# Patient Record
Sex: MALE | Race: WHITE | Employment: FULL TIME | URBAN - METROPOLITAN AREA
[De-identification: names, ages, dates, MRNs, and addresses within clinical notes are randomized per-mention and may not be internally consistent; named-entity substitution may affect disease eponyms.]

---

## 2017-07-21 ENCOUNTER — TELEPHONE (OUTPATIENT)
Dept: INTERNAL MEDICINE CLINIC | Facility: CLINIC | Age: 47
End: 2017-07-21

## 2017-07-21 NOTE — TELEPHONE ENCOUNTER
Actions Requested:   Appointment made for Monday 7/24/17  Problem:   Blood pressure 171/95  Onset and Timing:   today  Associated Symptoms:   Patient was a doctor today for his elbow and his blood pressure was  171/95  Denies any headache, blurred vision,

## 2017-09-23 RX ORDER — FLUTICASONE PROPIONATE 50 MCG
SPRAY, SUSPENSION (ML) NASAL
Qty: 0 G | Refills: 1 | OUTPATIENT
Start: 2017-09-23

## 2017-09-23 NOTE — TELEPHONE ENCOUNTER
Pt has not been seen by any of our IM/FM providers. Therefore refill request denied with note for pt to call back and schedule appt.       Refill Protocol Appointment Criteria  · Appointment scheduled in the past 12 months or in the next 3 months  Recent Ou

## 2018-11-07 ENCOUNTER — APPOINTMENT (OUTPATIENT)
Dept: ULTRASOUND IMAGING | Facility: HOSPITAL | Age: 48
End: 2018-11-07
Payer: COMMERCIAL

## 2018-11-07 ENCOUNTER — APPOINTMENT (OUTPATIENT)
Dept: GENERAL RADIOLOGY | Facility: HOSPITAL | Age: 48
End: 2018-11-07
Attending: EMERGENCY MEDICINE
Payer: COMMERCIAL

## 2018-11-07 ENCOUNTER — HOSPITAL ENCOUNTER (EMERGENCY)
Facility: HOSPITAL | Age: 48
Discharge: HOME | End: 2018-11-07
Payer: COMMERCIAL

## 2018-11-07 VITALS
BODY MASS INDEX: 31.1 KG/M2 | OXYGEN SATURATION: 99 % | HEIGHT: 69 IN | DIASTOLIC BLOOD PRESSURE: 82 MMHG | SYSTOLIC BLOOD PRESSURE: 150 MMHG | TEMPERATURE: 98 F | WEIGHT: 210 LBS | HEART RATE: 86 BPM | RESPIRATION RATE: 20 BRPM

## 2018-11-07 DIAGNOSIS — S86.811A STRAIN OF CALF MUSCLE, RIGHT, INITIAL ENCOUNTER: Primary | ICD-10-CM

## 2018-11-07 PROCEDURE — 99284 EMERGENCY DEPT VISIT MOD MDM: CPT

## 2018-11-07 PROCEDURE — 73590 X-RAY EXAM OF LOWER LEG: CPT | Performed by: EMERGENCY MEDICINE

## 2018-11-07 PROCEDURE — 93971 EXTREMITY STUDY: CPT

## 2018-11-07 RX ORDER — NAPROXEN 500 MG/1
500 TABLET ORAL 2 TIMES DAILY WITH MEALS
Qty: 10 TABLET | Refills: 0 | Status: SHIPPED | OUTPATIENT
Start: 2018-11-07 | End: 2018-11-12

## 2018-11-07 NOTE — ED PROVIDER NOTES
Patient Seen in: [facility] Emergency Department    History   Patient presents with:  Musculoskeletal Problem    Stated Complaint: R Calf Pain    HPI    42-year-old male who does not smoke and without a history of blood clots he currently has not Oriented to person, place, and time. Appears well-developed. No distress. Head: Normocephalic and atraumatic. Eyes: Conjunctivae are normal. Pupils are equal, round, and reactive to light.    Cardiovascular: Normal rate, regular rhythm and intact dista right lower extremity was performed in the usual manner. FINDINGS: The femoral and popliteal veins appear normal.  Normal flow was demonstrated with color and pulsed Doppler.   Visualized portions of the great and small saphenous, posterior tibial, and per

## 2018-11-07 NOTE — ED INITIAL ASSESSMENT (HPI)
Pt to ER with c/o right calf pain since Sunday. Pt denies recent travel. Pt denies hx of DVT. Pt denies chest pain or sob.

## 2020-12-01 ENCOUNTER — APPOINTMENT (OUTPATIENT)
Dept: GENERAL RADIOLOGY | Facility: HOSPITAL | Age: 50
DRG: 812 | End: 2020-12-01
Attending: EMERGENCY MEDICINE
Payer: COMMERCIAL

## 2020-12-01 ENCOUNTER — HOSPITAL ENCOUNTER (INPATIENT)
Facility: HOSPITAL | Age: 50
LOS: 1 days | Discharge: HOME | DRG: 812 | End: 2020-12-02
Attending: EMERGENCY MEDICINE | Admitting: HOSPITALIST
Payer: COMMERCIAL

## 2020-12-01 DIAGNOSIS — D50.9 MICROCYTIC ANEMIA: ICD-10-CM

## 2020-12-01 DIAGNOSIS — K57.90 DIVERTICULOSIS: ICD-10-CM

## 2020-12-01 DIAGNOSIS — R55 SYNCOPE, NEAR: Primary | ICD-10-CM

## 2020-12-01 DIAGNOSIS — K31.7 GASTRIC POLYP: ICD-10-CM

## 2020-12-01 DIAGNOSIS — K64.8 INTERNAL HEMORRHOIDS: ICD-10-CM

## 2020-12-01 PROBLEM — D64.9 SYMPTOMATIC ANEMIA: Status: ACTIVE | Noted: 2020-12-01

## 2020-12-01 PROCEDURE — 99223 1ST HOSP IP/OBS HIGH 75: CPT | Performed by: HOSPITALIST

## 2020-12-01 PROCEDURE — 71045 X-RAY EXAM CHEST 1 VIEW: CPT | Performed by: EMERGENCY MEDICINE

## 2020-12-01 PROCEDURE — 30233N1 TRANSFUSION OF NONAUTOLOGOUS RED BLOOD CELLS INTO PERIPHERAL VEIN, PERCUTANEOUS APPROACH: ICD-10-PCS | Performed by: HOSPITALIST

## 2020-12-01 RX ORDER — DEXTROSE MONOHYDRATE 25 G/50ML
50 INJECTION, SOLUTION INTRAVENOUS
Status: DISCONTINUED | OUTPATIENT
Start: 2020-12-01 | End: 2020-12-02

## 2020-12-01 RX ORDER — DOCUSATE SODIUM 100 MG/1
100 CAPSULE, LIQUID FILLED ORAL 2 TIMES DAILY
Status: DISCONTINUED | OUTPATIENT
Start: 2020-12-01 | End: 2020-12-02

## 2020-12-01 RX ORDER — ACETAMINOPHEN 325 MG/1
650 TABLET ORAL EVERY 6 HOURS PRN
Status: DISCONTINUED | OUTPATIENT
Start: 2020-12-01 | End: 2020-12-02

## 2020-12-01 RX ORDER — ONDANSETRON 2 MG/ML
4 INJECTION INTRAMUSCULAR; INTRAVENOUS EVERY 6 HOURS PRN
Status: DISCONTINUED | OUTPATIENT
Start: 2020-12-01 | End: 2020-12-02

## 2020-12-01 RX ORDER — SODIUM CHLORIDE 9 MG/ML
INJECTION, SOLUTION INTRAVENOUS ONCE
Status: COMPLETED | OUTPATIENT
Start: 2020-12-01 | End: 2020-12-01

## 2020-12-01 NOTE — ED PROVIDER NOTES
Patient Seen in: [facility] AND [facility] Emergency Department      History   Patient presents with:  Syncope    Stated Complaint: near syncope    HPI    51-year-old male presents for evaluation after near syncopal episode.   Patient reports this morning while t None (Room air)       Current:/78   Pulse 79   Temp 98 °F (36.7 °C) (Oral)   Resp 14   Ht 175.3 cm (5' 9\")   Wt 88.5 kg   SpO2 99%   BMI 28.80 kg/m²         Physical Exam  Vitals signs and nursing note reviewed.    Constitutional:       General: He i MCHC 23.9 (*)     RDW 22.9 (*)     All other components within normal limits   PRO BETA NATRIURETIC PEPTIDE - Normal   TROPONIN I - Normal   D-DIMER - Normal   RAPID SARS-COV-2 BY PCR - Normal   CBC WITH DIFFERENTIAL WITH PLATELET    Narrative:      The TECHNIQUE:   Single view. FINDINGS:     CARDIAC/VASC: Heart size and pulmonary vascularity normal.     MEDIAST/JEREMÍAS:   No visible mass or adenopathy. LUNGS/PLEURA: No consolidation or pleural effusion.     BONES: No fracture or visible bony les further recommendations for the ED.     Impression:  After review and interpretation of the above emergency department workup, the patient was found to have Syncope, near  (primary encounter diagnosis)  Microcytic anemia    Plan: Patient with near syncopal

## 2020-12-01 NOTE — CONSULTS
[facility] - [facility]    Report of Consultation    Paz De León Patient Status:  Inpatient    3/24/1970 MRN [id number]   Location [facility] 3W/SW Attending Jaida Tate MD   Hosp Day # 0 PCP Stacy Trujillo DO     Date of Admissio Maternal Grandmother         Cancer, lung - smoker   • Cancer Maternal Grandfather         Cancer, lung-smoker       Social History  Patient Guardians:  Not on file    Other Topics            Concern  Caffeine Concern        Yes    Comment:Coffee, 2 cups d (88.5 kg), SpO2 100 %.   GENERAL: well developed, in no apparent distress  SKIN: no rashes,no suspicious lesions  HEENT: atraumatic, normocephalic, oropharynx clear  NECK: supple,no adenopathy, no masses  LUNGS: clear to auscultation  CARDIO: RRR without mu bleeding    Microcytic anemia of unknown chronicity  BRBPR- likely 2/2 known hemorrhoids    Plan  Recommend egd, colonoscopy with possible hemorrhoidal banding to r/o pud, celiac disease, bleeding polyp, etc  Discussed risks including infection, perforatio

## 2020-12-01 NOTE — ED NOTES
Orders for admission, patient is aware of plan and ready to go upstairs. Any questions, please call ED EAGLE Esqueda  at extension [phone].    Type of COVID test sent:rapid- negative  COVID Suspicion level: Low  Titratable drug(s) infusing:n/a  Rate:    LOC at t

## 2020-12-01 NOTE — ED INITIAL ASSESSMENT (HPI)
Pt to ED via EMS for near syncopal episode in the shower. Pt reports not feeling well for the past few months, getting tired and winded faster than normal.    Pt also reports having a brain aneurysm that is being watched. Last CT about a year ago.

## 2020-12-01 NOTE — H&P
[facility]    PATIENT'S NAME: Wilfredo Colbert   ATTENDING PHYSICIAN: Joanna Rhodes MD   PATIENT ACCOUNT#:   [de-identified]    LOCATION:  [address] [place] [address]  MEDICAL RECORD #:   [id number]       YOB: 1970  ADMISSION DATE:       12/01/2 PHYSICAL EXAMINATION:    GENERAL:  Alert and oriented to time, place and person, no acute distress. VITAL SIGNS:  Temperature 98.0, pulse 82, respiratory rate 17, blood pressure 139/77, pulse ox 99% on room air.   HEENT:  Atraumatic, oropharynx clear,

## 2020-12-02 ENCOUNTER — ANESTHESIA EVENT (OUTPATIENT)
Dept: ENDOSCOPY | Facility: HOSPITAL | Age: 50
DRG: 812 | End: 2020-12-02
Payer: COMMERCIAL

## 2020-12-02 ENCOUNTER — ANESTHESIA (OUTPATIENT)
Dept: ENDOSCOPY | Facility: HOSPITAL | Age: 50
DRG: 812 | End: 2020-12-02
Payer: COMMERCIAL

## 2020-12-02 VITALS
WEIGHT: 195 LBS | TEMPERATURE: 98 F | RESPIRATION RATE: 15 BRPM | HEART RATE: 77 BPM | DIASTOLIC BLOOD PRESSURE: 89 MMHG | SYSTOLIC BLOOD PRESSURE: 130 MMHG | HEIGHT: 69 IN | BODY MASS INDEX: 28.88 KG/M2 | OXYGEN SATURATION: 98 %

## 2020-12-02 PROCEDURE — 0DB98ZX EXCISION OF DUODENUM, VIA NATURAL OR ARTIFICIAL OPENING ENDOSCOPIC, DIAGNOSTIC: ICD-10-PCS | Performed by: STUDENT IN AN ORGANIZED HEALTH CARE EDUCATION/TRAINING PROGRAM

## 2020-12-02 PROCEDURE — 0DB68ZX EXCISION OF STOMACH, VIA NATURAL OR ARTIFICIAL OPENING ENDOSCOPIC, DIAGNOSTIC: ICD-10-PCS | Performed by: STUDENT IN AN ORGANIZED HEALTH CARE EDUCATION/TRAINING PROGRAM

## 2020-12-02 PROCEDURE — 99239 HOSP IP/OBS DSCHRG MGMT >30: CPT | Performed by: HOSPITALIST

## 2020-12-02 PROCEDURE — 0DJD8ZZ INSPECTION OF LOWER INTESTINAL TRACT, VIA NATURAL OR ARTIFICIAL OPENING ENDOSCOPIC: ICD-10-PCS | Performed by: STUDENT IN AN ORGANIZED HEALTH CARE EDUCATION/TRAINING PROGRAM

## 2020-12-02 RX ORDER — SODIUM CHLORIDE, SODIUM LACTATE, POTASSIUM CHLORIDE, CALCIUM CHLORIDE 600; 310; 30; 20 MG/100ML; MG/100ML; MG/100ML; MG/100ML
INJECTION, SOLUTION INTRAVENOUS CONTINUOUS
Status: DISCONTINUED | OUTPATIENT
Start: 2020-12-02 | End: 2020-12-02

## 2020-12-02 RX ORDER — GLYCOPYRROLATE 0.2 MG/ML
INJECTION, SOLUTION INTRAMUSCULAR; INTRAVENOUS AS NEEDED
Status: DISCONTINUED | OUTPATIENT
Start: 2020-12-02 | End: 2020-12-02 | Stop reason: SURG

## 2020-12-02 RX ORDER — HYDROCODONE BITARTRATE AND ACETAMINOPHEN 5; 325 MG/1; MG/1
1 TABLET ORAL EVERY 6 HOURS PRN
Status: DISCONTINUED | OUTPATIENT
Start: 2020-12-02 | End: 2020-12-02

## 2020-12-02 RX ORDER — NALOXONE HYDROCHLORIDE 0.4 MG/ML
80 INJECTION, SOLUTION INTRAMUSCULAR; INTRAVENOUS; SUBCUTANEOUS AS NEEDED
Status: DISCONTINUED | OUTPATIENT
Start: 2020-12-02 | End: 2020-12-02 | Stop reason: HOSPADM

## 2020-12-02 RX ORDER — DEXTROSE MONOHYDRATE 25 G/50ML
50 INJECTION, SOLUTION INTRAVENOUS
Status: DISCONTINUED | OUTPATIENT
Start: 2020-12-02 | End: 2020-12-02 | Stop reason: HOSPADM

## 2020-12-02 RX ORDER — MIDAZOLAM HYDROCHLORIDE 1 MG/ML
INJECTION INTRAMUSCULAR; INTRAVENOUS AS NEEDED
Status: DISCONTINUED | OUTPATIENT
Start: 2020-12-02 | End: 2020-12-02 | Stop reason: SURG

## 2020-12-02 RX ORDER — LIDOCAINE HYDROCHLORIDE 10 MG/ML
INJECTION, SOLUTION EPIDURAL; INFILTRATION; INTRACAUDAL; PERINEURAL AS NEEDED
Status: DISCONTINUED | OUTPATIENT
Start: 2020-12-02 | End: 2020-12-02 | Stop reason: SURG

## 2020-12-02 RX ORDER — SODIUM CHLORIDE, SODIUM LACTATE, POTASSIUM CHLORIDE, CALCIUM CHLORIDE 600; 310; 30; 20 MG/100ML; MG/100ML; MG/100ML; MG/100ML
INJECTION, SOLUTION INTRAVENOUS CONTINUOUS PRN
Status: DISCONTINUED | OUTPATIENT
Start: 2020-12-02 | End: 2020-12-02 | Stop reason: SURG

## 2020-12-02 RX ADMIN — GLYCOPYRROLATE 0.1 MG: 0.2 INJECTION, SOLUTION INTRAMUSCULAR; INTRAVENOUS at 13:03:00

## 2020-12-02 RX ADMIN — SODIUM CHLORIDE, SODIUM LACTATE, POTASSIUM CHLORIDE, CALCIUM CHLORIDE: 600; 310; 30; 20 INJECTION, SOLUTION INTRAVENOUS at 12:48:00

## 2020-12-02 RX ADMIN — SODIUM CHLORIDE, SODIUM LACTATE, POTASSIUM CHLORIDE, CALCIUM CHLORIDE: 600; 310; 30; 20 INJECTION, SOLUTION INTRAVENOUS at 13:23:00

## 2020-12-02 RX ADMIN — LIDOCAINE HYDROCHLORIDE 50 MG: 10 INJECTION, SOLUTION EPIDURAL; INFILTRATION; INTRACAUDAL; PERINEURAL at 12:52:00

## 2020-12-02 RX ADMIN — MIDAZOLAM HYDROCHLORIDE 2 MG: 1 INJECTION INTRAMUSCULAR; INTRAVENOUS at 13:03:00

## 2020-12-02 NOTE — DISCHARGE SUMMARY
[facility] - [facility]  Discharge Summary     Cyndie Shukla  : 3/24/1970    Status: Inpatient  Day #: 1    Attending: Fozia Moore MD  PCP: Cintia Bills DO     Date of Admission:  2020  Date of Discharge:  2020     Hospital Discharge respirations unlabored  Gastrointestinal:  Soft, non-tender, normal bowel sounds  Musculoskeletal:  No joint swelling  Extremities:  No edema, no cyanosis, no clubbing  Neurologic:  nonfocal  Psychiatric:  Normal affect, calm and appropriate         Discha

## 2020-12-02 NOTE — OPERATIVE REPORT
EGD/Colonoscopy Operative Report    Yang Roth Patient Status:  Inpatient    3/24/1970 MRN [id number]   Location [facility] withdrawn to examine the esophagus. The endoscope was then removed from the patient. The patient tolerated the procedure well with no immediate complications. The patient was then repositioned for colonoscopy.   After careful digital rectal examination, the

## 2020-12-02 NOTE — PAYOR COMM NOTE
--------------  ADMISSION REVIEW     Payor: JENNY HU  Subscriber #:  [id number]  Authorization Number: [id number]    Admit date: 12/1/20  Admit time: 6162       Patient Seen in: [facility] Emergency Department    History   Patient presents with: S Musculoskeletal: Normal range of motion. Skin:     General: Skin is warm and dry. Findings: No rash. Neurological:      General: No focal deficit present. Mental Status: He is alert and oriented to person, place, and time.       Cranial [employer] several banding procedures, last one was in 2016. He follows up with Gastroenterology, Dr. Merlin Hooper. He had no other medical problems to his knowledge. PAST SURGICAL HISTORY:  Hemorrhoid banding procedures, left shoulder labral tear repair.   Also, he wa floor. We will repeat his hemoglobin; if he drops further, and because of his symptoms, he will get a blood transfusion. Check his iron level, and give him 1 dose of IV Venofer. Stool softeners. Monitor his Accu-Cheks. Obtain hemoglobin A1c.   Dennis Esteves ADMINISTERED IN LAST 1 DAY:        iron sucrose (VENOFER) IV Push 200 mg     Date Action Dose Route     12/1/2020 1521 New Bag 200 mg Intravenous       polyethylene glycol (GOLYTELY) solution 4,000 mL     Date Action Dose Route     12/1/2020 1704 Given 4,0

## 2020-12-02 NOTE — PLAN OF CARE
Patient was provided with discharge instructions, education, and follow up information.  No changes were made to patient's medication list. Patient verbalizes understanding of follow up information, specifically high fiber diet choices, increasing water int

## 2020-12-02 NOTE — ANESTHESIA POSTPROCEDURE EVALUATION
Patient: Daniel May    Procedure Summary     Date: 12/02/20 Room / Location: [address] [place] [address] ENDOSCOPY 04 / [address] [place] [address] ENDOSCOPY    Anesthesia Start: 7975 Anesthesia Stop: 7934    Procedures:       ESOPHAGOGASTRODUODENOSCOPY (EGD) (N/A )      COLONOSCOPY (N/A ) Ebenezer Tian

## 2020-12-02 NOTE — ANESTHESIA PREPROCEDURE EVALUATION
Anesthesia PreOp Note    HPI:     Mary Munguia is a 48year old male who presents for preoperative consultation requested by: Kirti Lomax DO    Date of Surgery: 12/1/2020 - 12/2/2020    Procedure(s):  ESOPHAGOGASTRODUODENOSCOPY (EGD)  COLONOSCOPY  I mg, 4 mg, Intravenous, Q6H PRN, Edita Enriquez MD    •  docusate sodium (COLACE) cap 100 mg, 100 mg, Oral, BID, Kedar Rowell MD    •  glucose (DEX4) oral liquid 15 g, 15 g, Oral, Q15 Min PRN, Edita Enriquez MD    Or    •  Glucose-Vitamin C (DEX-4) Physical activity        Days per week: Not on file        Minutes per session: Not on file      Stress: Not on file    Relationships      Social connections        Talks on phone: Not on file        Gets together: Not on file        Attends [employer] respiration is 16 and oxygen saturation is 99%.     12/02/20  0300 12/02/20  0358 12/02/20  0900 12/02/20  1203   BP:  130/77 139/85 149/87   Pulse: 78 75 80 76   Resp:  18 18 16   Temp:  98.2 °F (36.8 °C) 98.3 °F (36.8 °C)    TempSrc:  Oral Oral    SpO2:

## 2020-12-02 NOTE — PROGRESS NOTES
[facility] - [facility]  Progress Note     Eduardo Salcido  : 3/24/1970    Status: Inpatient  Day #: 1    Attending: Kashmir Kaur MD  PCP: Maryam Moreno DO      Assessment and Plan     Anemia  Suspected chronic blood loss   Iron deficiency  -possib --    TP 8.1  --    DDIMER <0.27  --    TROP <0.045  --        Xr Chest Ap Portable  (cpt=71045)    Result Date: 12/1/2020  CONCLUSION:  1.  Negative chest.    Dictated by (CST): Candy Valdez MD on 12/01/2020 at 9:49 AM     Finalized by (CST): Preethi Saab

## 2020-12-03 NOTE — PAYOR COMM NOTE
--------------  DISCHARGE REVIEW    Payor: JENNY HU  Subscriber #:  [id number]  Authorization Number: [id number]    Admit date: 12/1/20  Admit time:  0620  Discharge Date: 12/2/2020  5:20 PM     Admitting Physician: Claribel Valentino MD  Attending Physici

## 2020-12-14 ENCOUNTER — OFFICE VISIT (OUTPATIENT)
Dept: FAMILY MEDICINE CLINIC | Facility: CLINIC | Age: 50
End: 2020-12-14
Payer: COMMERCIAL

## 2020-12-14 VITALS
SYSTOLIC BLOOD PRESSURE: 151 MMHG | HEART RATE: 73 BPM | DIASTOLIC BLOOD PRESSURE: 81 MMHG | WEIGHT: 198 LBS | HEIGHT: 69 IN | BODY MASS INDEX: 29.33 KG/M2

## 2020-12-14 DIAGNOSIS — D64.9 SYMPTOMATIC ANEMIA: ICD-10-CM

## 2020-12-14 DIAGNOSIS — R03.0 ELEVATED BLOOD-PRESSURE READING WITHOUT DIAGNOSIS OF HYPERTENSION: ICD-10-CM

## 2020-12-14 DIAGNOSIS — D50.9 MICROCYTIC ANEMIA: ICD-10-CM

## 2020-12-14 DIAGNOSIS — K64.8 INTERNAL HEMORRHOIDS: ICD-10-CM

## 2020-12-14 DIAGNOSIS — D50.0 IRON DEFICIENCY ANEMIA DUE TO CHRONIC BLOOD LOSS: Primary | ICD-10-CM

## 2020-12-14 DIAGNOSIS — R55 SYNCOPE, NEAR: ICD-10-CM

## 2020-12-14 PROBLEM — I67.1 BRAIN ANEURYSM: Status: ACTIVE | Noted: 2020-12-14

## 2020-12-14 PROBLEM — I67.1 BRAIN ANEURYSM (HCC): Status: ACTIVE | Noted: 2020-12-14

## 2020-12-14 PROCEDURE — 3079F DIAST BP 80-89 MM HG: CPT | Performed by: FAMILY MEDICINE

## 2020-12-14 PROCEDURE — 1111F DSCHRG MED/CURRENT MED MERGE: CPT | Performed by: FAMILY MEDICINE

## 2020-12-14 PROCEDURE — 3008F BODY MASS INDEX DOCD: CPT | Performed by: FAMILY MEDICINE

## 2020-12-14 PROCEDURE — 3077F SYST BP >= 140 MM HG: CPT | Performed by: FAMILY MEDICINE

## 2020-12-14 PROCEDURE — 99203 OFFICE O/P NEW LOW 30 MIN: CPT | Performed by: FAMILY MEDICINE

## 2020-12-14 RX ORDER — LANSOPRAZOLE 15 MG/1
CAPSULE, DELAYED RELEASE ORAL
COMMUNITY
Start: 2010-12-14

## 2020-12-14 NOTE — PROGRESS NOTES
Dear Estephania Pedor,  Your screening test for celiac disease is negative. It is unclear why you have an increased number of lymphocytes on your small intestinal biopsies- I would recommend stopping any ibuprofen/aleve other NSAID medication.    Your low blood cou

## 2020-12-14 NOTE — PROGRESS NOTES
HPI:    Patient ID: Mojgan Whatley is a 48year old male.     HPI  Patient presents with:  Hospital F/U: follow up from ER at [address] [place] [address] on 12/1/2020 for Syncope   Blood Pressure: having high BP  readings recently, upper 138/85   Symptomatic anemia and near [facility]

## 2021-01-11 ENCOUNTER — PATIENT MESSAGE (OUTPATIENT)
Dept: FAMILY MEDICINE CLINIC | Facility: CLINIC | Age: 51
End: 2021-01-11

## 2021-01-11 DIAGNOSIS — D64.9 SYMPTOMATIC ANEMIA: Primary | ICD-10-CM

## 2021-01-11 NOTE — TELEPHONE ENCOUNTER
From: Lior Mi  To: Yocasta Lauren DO  Sent: 1/11/2021 7:36 AM CST  Subject: Visit Follow-up Question    I saw Dr. Anahy Arroyo on Dec 14 after being released from hospital for severe anemia.  He said he wanted to get my anemia under control before ha

## 2021-01-26 ENCOUNTER — TELEPHONE (OUTPATIENT)
Dept: INTERNAL MEDICINE CLINIC | Facility: CLINIC | Age: 51
End: 2021-01-26

## 2021-01-26 NOTE — TELEPHONE ENCOUNTER
I received lab results for this patient- they were sent via fax to the coumadin clinic. They are results on a H&H ordered by the PCP. What is the best fax # that I can get them to Dr. Reji Chris?

## 2021-02-01 ENCOUNTER — TELEPHONE (OUTPATIENT)
Dept: FAMILY MEDICINE CLINIC | Facility: CLINIC | Age: 51
End: 2021-02-01

## 2021-02-01 NOTE — TELEPHONE ENCOUNTER
Pt spouse states pt has labs done through [facility] by accident and they are faxing the results to Dr Tejal Salvador office. Pt spouse is checking the labs were received by Dr Jessica Romano.

## 2021-02-04 NOTE — TELEPHONE ENCOUNTER
Lab results received are in orange folder waiting for dr to review      Called pt LM in regards to notifying dr received, waiting for dr to review

## 2021-02-05 NOTE — TELEPHONE ENCOUNTER
Called spoke with pt in regards to test results are normal, pt states to send copy to home, veriified address with pt mailed copy

## 2021-02-10 ENCOUNTER — MED REC SCAN ONLY (OUTPATIENT)
Dept: FAMILY MEDICINE CLINIC | Facility: CLINIC | Age: 51
End: 2021-02-10

## 2021-02-18 ENCOUNTER — LAB ENCOUNTER (OUTPATIENT)
Dept: LAB | Facility: REFERENCE LAB | Age: 51
End: 2021-02-18
Attending: FAMILY MEDICINE
Payer: COMMERCIAL

## 2021-02-18 ENCOUNTER — OFFICE VISIT (OUTPATIENT)
Dept: FAMILY MEDICINE CLINIC | Facility: CLINIC | Age: 51
End: 2021-02-18
Payer: COMMERCIAL

## 2021-02-18 VITALS
HEIGHT: 69 IN | HEART RATE: 94 BPM | SYSTOLIC BLOOD PRESSURE: 137 MMHG | WEIGHT: 204 LBS | DIASTOLIC BLOOD PRESSURE: 87 MMHG | BODY MASS INDEX: 30.21 KG/M2

## 2021-02-18 DIAGNOSIS — D50.9 MICROCYTIC ANEMIA: ICD-10-CM

## 2021-02-18 DIAGNOSIS — R03.0 ELEVATED BLOOD-PRESSURE READING WITHOUT DIAGNOSIS OF HYPERTENSION: ICD-10-CM

## 2021-02-18 DIAGNOSIS — Z00.00 ROUTINE GENERAL MEDICAL EXAMINATION AT A HEALTH CARE FACILITY: ICD-10-CM

## 2021-02-18 DIAGNOSIS — I67.1 BRAIN ANEURYSM: ICD-10-CM

## 2021-02-18 DIAGNOSIS — Z00.00 ROUTINE GENERAL MEDICAL EXAMINATION AT A HEALTH CARE FACILITY: Primary | ICD-10-CM

## 2021-02-18 DIAGNOSIS — K64.8 INTERNAL HEMORRHOIDS: ICD-10-CM

## 2021-02-18 PROBLEM — K14.1 GEOGRAPHICAL TONGUE: Status: ACTIVE | Noted: 2021-02-18

## 2021-02-18 PROBLEM — R55 SYNCOPE, NEAR: Status: RESOLVED | Noted: 2020-12-01 | Resolved: 2021-02-18

## 2021-02-18 LAB
ALBUMIN SERPL-MCNC: 4.1 G/DL (ref 3.4–5)
ALBUMIN/GLOB SERPL: 1 {RATIO} (ref 1–2)
ALP LIVER SERPL-CCNC: 69 U/L
ALT SERPL-CCNC: 165 U/L
ANION GAP SERPL CALC-SCNC: 3 MMOL/L (ref 0–18)
AST SERPL-CCNC: 58 U/L (ref 15–37)
BILIRUB SERPL-MCNC: 0.5 MG/DL (ref 0.1–2)
BILIRUB UR QL: NEGATIVE
BUN BLD-MCNC: 15 MG/DL (ref 7–18)
BUN/CREAT SERPL: 15 (ref 10–20)
CALCIUM BLD-MCNC: 9.6 MG/DL (ref 8.5–10.1)
CHLORIDE SERPL-SCNC: 106 MMOL/L (ref 98–112)
CHOLEST SMN-MCNC: 179 MG/DL (ref ?–200)
CO2 SERPL-SCNC: 30 MMOL/L (ref 21–32)
COLOR UR: YELLOW
COMPLEXED PSA SERPL-MCNC: 0.64 NG/ML (ref ?–4)
CREAT BLD-MCNC: 1 MG/DL
EST. AVERAGE GLUCOSE BLD GHB EST-MCNC: 103 MG/DL (ref 68–126)
GLOBULIN PLAS-MCNC: 4.1 G/DL (ref 2.8–4.4)
GLUCOSE BLD-MCNC: 102 MG/DL (ref 70–99)
GLUCOSE UR-MCNC: NEGATIVE MG/DL
HBA1C MFR BLD HPLC: 5.2 % (ref ?–5.7)
HDLC SERPL-MCNC: 64 MG/DL (ref 40–59)
HGB UR QL STRIP.AUTO: NEGATIVE
KETONES UR-MCNC: NEGATIVE MG/DL
LDLC SERPL CALC-MCNC: 103 MG/DL (ref ?–100)
LEUKOCYTE ESTERASE UR QL STRIP.AUTO: NEGATIVE
M PROTEIN MFR SERPL ELPH: 8.2 G/DL (ref 6.4–8.2)
NITRITE UR QL STRIP.AUTO: NEGATIVE
NONHDLC SERPL-MCNC: 115 MG/DL (ref ?–130)
OSMOLALITY SERPL CALC.SUM OF ELEC: 289 MOSM/KG (ref 275–295)
PATIENT FASTING Y/N/NP: YES
PATIENT FASTING Y/N/NP: YES
PH UR: 6 [PH] (ref 5–8)
POTASSIUM SERPL-SCNC: 4.2 MMOL/L (ref 3.5–5.1)
PROT UR-MCNC: NEGATIVE MG/DL
SODIUM SERPL-SCNC: 139 MMOL/L (ref 136–145)
SP GR UR STRIP: 1.03 (ref 1–1.03)
TRIGL SERPL-MCNC: 62 MG/DL (ref 30–149)
UROBILINOGEN UR STRIP-ACNC: <2
VLDLC SERPL CALC-MCNC: 12 MG/DL (ref 0–30)

## 2021-02-18 PROCEDURE — 81003 URINALYSIS AUTO W/O SCOPE: CPT

## 2021-02-18 PROCEDURE — 3079F DIAST BP 80-89 MM HG: CPT | Performed by: FAMILY MEDICINE

## 2021-02-18 PROCEDURE — 3008F BODY MASS INDEX DOCD: CPT | Performed by: FAMILY MEDICINE

## 2021-02-18 PROCEDURE — 99396 PREV VISIT EST AGE 40-64: CPT | Performed by: FAMILY MEDICINE

## 2021-02-18 PROCEDURE — 36415 COLL VENOUS BLD VENIPUNCTURE: CPT

## 2021-02-18 PROCEDURE — 83036 HEMOGLOBIN GLYCOSYLATED A1C: CPT

## 2021-02-18 PROCEDURE — 80061 LIPID PANEL: CPT

## 2021-02-18 PROCEDURE — 3075F SYST BP GE 130 - 139MM HG: CPT | Performed by: FAMILY MEDICINE

## 2021-02-18 PROCEDURE — 80053 COMPREHEN METABOLIC PANEL: CPT

## 2021-02-18 NOTE — PROGRESS NOTES
HPI:    Patient ID: Mojgan Whatley is a 48year old male.     HPI  Patient presents with:  Physical: annual physical     Past Medical History:   Diagnosis Date   • Acid reflux    • Disorder of liver     elevated liver enzymes    • Elevated liver enzymes The left eye shows no hemorrhage and no papilledema. Neck: Trachea normal and normal range of motion. Neck supple. Normal carotid pulses and no JVD present. Cardiovascular: Regular rhythm and normal heart sounds.    Pulses:       Carotid pulses are 2+ o

## 2021-02-23 DIAGNOSIS — R74.8 ELEVATED LIVER ENZYMES: Primary | ICD-10-CM

## 2021-03-11 ENCOUNTER — HOSPITAL ENCOUNTER (OUTPATIENT)
Dept: CT IMAGING | Age: 51
Discharge: HOME | End: 2021-03-11
Attending: FAMILY MEDICINE

## 2021-03-11 DIAGNOSIS — Z00.00 ROUTINE GENERAL MEDICAL EXAMINATION AT A HEALTH CARE FACILITY: ICD-10-CM

## 2021-03-29 ENCOUNTER — MED REC SCAN ONLY (OUTPATIENT)
Dept: FAMILY MEDICINE CLINIC | Facility: CLINIC | Age: 51
End: 2021-03-29

## 2021-04-07 ENCOUNTER — OFFICE VISIT (OUTPATIENT)
Dept: SURGERY | Facility: CLINIC | Age: 51
End: 2021-04-07
Payer: COMMERCIAL

## 2021-04-07 VITALS — WEIGHT: 204 LBS | BODY MASS INDEX: 30.21 KG/M2 | HEIGHT: 69 IN

## 2021-04-07 DIAGNOSIS — K64.8 HEMORRHOIDS, COMPLICATED: Primary | ICD-10-CM

## 2021-04-07 PROCEDURE — 99244 OFF/OP CNSLTJ NEW/EST MOD 40: CPT | Performed by: SURGERY

## 2021-04-07 PROCEDURE — 46600 DIAGNOSTIC ANOSCOPY SPX: CPT | Performed by: SURGERY

## 2021-04-07 PROCEDURE — 3008F BODY MASS INDEX DOCD: CPT | Performed by: SURGERY

## 2021-04-07 NOTE — H&P
Chief complaint: Patient presents with:  Hemorrhoids: Referred by Dr. Osiris Eastman for hemorrhoids. HPI: Shubham Ramirez has had bleeding hemorrhoids for years, in December he bled to symptomatic anemia and had a syncopal episode.   He was admitted and trans Alcohol/week: 12.0 standard drinks        Types: 12 Glasses of wine per week        Comment: wine daily      Drug use: No      Sexual activity: Yes        Partners: Female       Family history:  Family History   Problem Relation Age of Onset   • Other no fistula seen on anoscopy, minimal pain  Extremities: no edema, cyanosis, or clubbing. No deformity. Musculoskeletal: normal appearance, no deformities, normal function. Back wnl, no CVA tenderness.   Neurological: exam appropriate for age reflexes and m

## 2021-04-15 ENCOUNTER — OFFICE VISIT (OUTPATIENT)
Dept: SURGERY | Facility: CLINIC | Age: 51
End: 2021-04-15
Payer: COMMERCIAL

## 2021-04-15 VITALS — BODY MASS INDEX: 30.21 KG/M2 | HEIGHT: 69 IN | WEIGHT: 204 LBS

## 2021-04-15 DIAGNOSIS — K64.8 HEMORRHOIDS, COMPLICATED: Primary | ICD-10-CM

## 2021-04-15 PROCEDURE — 3008F BODY MASS INDEX DOCD: CPT | Performed by: SURGERY

## 2021-04-15 PROCEDURE — 46221 LIGATION OF HEMORRHOID(S): CPT | Performed by: SURGERY

## 2021-04-15 NOTE — PROCEDURES
4/15/2021    PREOP DX:  Complicated Internal Hemorrhoid  POSTOP DX:  same  PROCEDURE: Internal Hemorrhoid Banding x 2  ANESTHESIA:  Local  EBL:  minimal    Description:  Ankita Adams is a  46year old male who presents for the first in several years i

## 2021-05-27 ENCOUNTER — OFFICE VISIT (OUTPATIENT)
Dept: SURGERY | Facility: CLINIC | Age: 51
End: 2021-05-27
Payer: COMMERCIAL

## 2021-05-27 VITALS — HEIGHT: 69 IN | BODY MASS INDEX: 30.21 KG/M2 | WEIGHT: 204 LBS

## 2021-05-27 DIAGNOSIS — K64.8 HEMORRHOIDS, COMPLICATED: Primary | ICD-10-CM

## 2021-05-27 PROCEDURE — 3008F BODY MASS INDEX DOCD: CPT | Performed by: SURGERY

## 2021-05-27 PROCEDURE — 46221 LIGATION OF HEMORRHOID(S): CPT | Performed by: SURGERY

## 2021-05-27 NOTE — PROCEDURES
5/27/2021    PREOP DX:  Complicated Internal Hemorrhoid  POSTOP DX:  same  PROCEDURE: Internal Hemorrhoid Banding  ANESTHESIA:  Local  EBL:  minimal    Description:  Funmi Thakkar is a  46year old male who presents for the second recent internal hemor

## 2022-04-20 ENCOUNTER — APPOINTMENT (OUTPATIENT)
Dept: GENERAL RADIOLOGY | Facility: HOSPITAL | Age: 52
End: 2022-04-20
Attending: EMERGENCY MEDICINE
Payer: COMMERCIAL

## 2022-04-20 ENCOUNTER — HOSPITAL ENCOUNTER (EMERGENCY)
Facility: HOSPITAL | Age: 52
Discharge: HOME | End: 2022-04-20
Attending: EMERGENCY MEDICINE
Payer: COMMERCIAL

## 2022-04-20 VITALS
OXYGEN SATURATION: 94 % | BODY MASS INDEX: 31.1 KG/M2 | RESPIRATION RATE: 16 BRPM | DIASTOLIC BLOOD PRESSURE: 92 MMHG | SYSTOLIC BLOOD PRESSURE: 156 MMHG | HEART RATE: 79 BPM | WEIGHT: 210 LBS | HEIGHT: 69 IN | TEMPERATURE: 98 F

## 2022-04-20 DIAGNOSIS — I10 PRIMARY HYPERTENSION: Primary | ICD-10-CM

## 2022-04-20 LAB
ANION GAP SERPL CALC-SCNC: 7 MMOL/L (ref 0–18)
BASOPHILS # BLD AUTO: 0.06 X10(3) UL (ref 0–0.2)
BASOPHILS NFR BLD AUTO: 0.9 %
BUN BLD-MCNC: 14 MG/DL (ref 7–18)
BUN/CREAT SERPL: 13.5 (ref 10–20)
CALCIUM BLD-MCNC: 9.2 MG/DL (ref 8.5–10.1)
CHLORIDE SERPL-SCNC: 104 MMOL/L (ref 98–112)
CO2 SERPL-SCNC: 25 MMOL/L (ref 21–32)
CREAT BLD-MCNC: 1.04 MG/DL
DEPRECATED RDW RBC AUTO: 44 FL (ref 35.1–46.3)
EOSINOPHIL # BLD AUTO: 0.3 X10(3) UL (ref 0–0.7)
EOSINOPHIL NFR BLD AUTO: 4.5 %
ERYTHROCYTE [DISTWIDTH] IN BLOOD BY AUTOMATED COUNT: 14.1 % (ref 11–15)
GLUCOSE BLD-MCNC: 121 MG/DL (ref 70–99)
HCT VFR BLD AUTO: 56.9 %
HGB BLD-MCNC: 18.2 G/DL
IMM GRANULOCYTES # BLD AUTO: 0.01 X10(3) UL (ref 0–1)
IMM GRANULOCYTES NFR BLD: 0.2 %
LYMPHOCYTES # BLD AUTO: 3.08 X10(3) UL (ref 1–4)
LYMPHOCYTES NFR BLD AUTO: 46.5 %
MCH RBC QN AUTO: 27.6 PG (ref 26–34)
MCHC RBC AUTO-ENTMCNC: 32 G/DL (ref 31–37)
MCV RBC AUTO: 86.3 FL
MONOCYTES # BLD AUTO: 0.71 X10(3) UL (ref 0.1–1)
MONOCYTES NFR BLD AUTO: 10.7 %
NEUTROPHILS # BLD AUTO: 2.46 X10 (3) UL (ref 1.5–7.7)
NEUTROPHILS # BLD AUTO: 2.46 X10(3) UL (ref 1.5–7.7)
NEUTROPHILS NFR BLD AUTO: 37.2 %
OSMOLALITY SERPL CALC.SUM OF ELEC: 284 MOSM/KG (ref 275–295)
PLATELET # BLD AUTO: 228 10(3)UL (ref 150–450)
POTASSIUM SERPL-SCNC: 3.4 MMOL/L (ref 3.5–5.1)
RBC # BLD AUTO: 6.59 X10(6)UL
SODIUM SERPL-SCNC: 136 MMOL/L (ref 136–145)
TROPONIN I HIGH SENSITIVITY: 7 NG/L
TSI SER-ACNC: 0.98 MIU/ML (ref 0.36–3.74)
WBC # BLD AUTO: 6.6 X10(3) UL (ref 4–11)

## 2022-04-20 PROCEDURE — 84484 ASSAY OF TROPONIN QUANT: CPT | Performed by: EMERGENCY MEDICINE

## 2022-04-20 PROCEDURE — 71045 X-RAY EXAM CHEST 1 VIEW: CPT | Performed by: EMERGENCY MEDICINE

## 2022-04-20 PROCEDURE — 80048 BASIC METABOLIC PNL TOTAL CA: CPT | Performed by: EMERGENCY MEDICINE

## 2022-04-20 PROCEDURE — 84443 ASSAY THYROID STIM HORMONE: CPT | Performed by: EMERGENCY MEDICINE

## 2022-04-20 PROCEDURE — 99284 EMERGENCY DEPT VISIT MOD MDM: CPT

## 2022-04-20 PROCEDURE — 85025 COMPLETE CBC W/AUTO DIFF WBC: CPT | Performed by: EMERGENCY MEDICINE

## 2022-04-20 PROCEDURE — 85060 BLOOD SMEAR INTERPRETATION: CPT | Performed by: EMERGENCY MEDICINE

## 2022-04-20 PROCEDURE — 93010 ELECTROCARDIOGRAM REPORT: CPT | Performed by: EMERGENCY MEDICINE

## 2022-04-20 PROCEDURE — 93005 ELECTROCARDIOGRAM TRACING: CPT

## 2022-04-20 PROCEDURE — 36415 COLL VENOUS BLD VENIPUNCTURE: CPT

## 2022-04-20 NOTE — ED INITIAL ASSESSMENT (HPI)
Patient c/o dizziness and palpitations during an online meeting. Patient has history of hypertension but no medication for it. Patient denies chest pain, fever, abdominal pain, or anxiety history. Patient states he felt like he drank a lot of coffee. Patient had one cup of coffee this morning.   Patient states he feels better after arriving at the hospital.

## 2022-04-21 ENCOUNTER — TELEPHONE (OUTPATIENT)
Dept: FAMILY MEDICINE CLINIC | Facility: CLINIC | Age: 52
End: 2022-04-21

## 2022-04-21 NOTE — TELEPHONE ENCOUNTER
Call transferred from [facility]-stated Pt made ER f/u appt for tomorrow -none available today- Pt was seen in ER elevated BP -193/101, advised to see Dr for medication  Pt stated it was high at home 210/100, called 911, felt dizzy  Pt concern about waitng until tomorrow for appt-feels anxious BP will go back up, concern he needs to be started on medication prior to tomorrow's appt     Please advise bp 150/93 today , slight dizzy but anxious

## 2022-04-21 NOTE — TELEPHONE ENCOUNTER
Dr rodriguez , relayed will see pt tomorrow, BP not too high at the present    Pt advised, advised to try to not be so anxious because this will elevate his BP,and not to keep checking bp so often, have checked bp 3 times this AM, pt/wife verbalized understanding

## 2022-04-22 ENCOUNTER — OFFICE VISIT (OUTPATIENT)
Dept: FAMILY MEDICINE CLINIC | Facility: CLINIC | Age: 52
End: 2022-04-22
Payer: COMMERCIAL

## 2022-04-22 VITALS
SYSTOLIC BLOOD PRESSURE: 151 MMHG | DIASTOLIC BLOOD PRESSURE: 80 MMHG | WEIGHT: 210 LBS | HEIGHT: 69 IN | HEART RATE: 83 BPM | BODY MASS INDEX: 31.1 KG/M2

## 2022-04-22 DIAGNOSIS — F41.0 PANIC ATTACK: ICD-10-CM

## 2022-04-22 DIAGNOSIS — R03.0 ELEVATED BP WITHOUT DIAGNOSIS OF HYPERTENSION: Primary | ICD-10-CM

## 2022-04-22 PROCEDURE — 3079F DIAST BP 80-89 MM HG: CPT | Performed by: FAMILY MEDICINE

## 2022-04-22 PROCEDURE — 3008F BODY MASS INDEX DOCD: CPT | Performed by: FAMILY MEDICINE

## 2022-04-22 PROCEDURE — 3077F SYST BP >= 140 MM HG: CPT | Performed by: FAMILY MEDICINE

## 2022-04-22 PROCEDURE — 99214 OFFICE O/P EST MOD 30 MIN: CPT | Performed by: FAMILY MEDICINE

## 2022-04-22 RX ORDER — METOPROLOL SUCCINATE 25 MG/1
25 TABLET, EXTENDED RELEASE ORAL DAILY
Qty: 30 TABLET | Refills: 1 | Status: SHIPPED | OUTPATIENT
Start: 2022-04-22

## 2022-05-26 ENCOUNTER — OFFICE VISIT (OUTPATIENT)
Dept: FAMILY MEDICINE CLINIC | Facility: CLINIC | Age: 52
End: 2022-05-26
Payer: COMMERCIAL

## 2022-05-26 VITALS
WEIGHT: 204 LBS | HEIGHT: 69 IN | SYSTOLIC BLOOD PRESSURE: 122 MMHG | DIASTOLIC BLOOD PRESSURE: 69 MMHG | HEART RATE: 58 BPM | BODY MASS INDEX: 30.21 KG/M2

## 2022-05-26 DIAGNOSIS — Z00.00 ROUTINE GENERAL MEDICAL EXAMINATION AT A HEALTH CARE FACILITY: Primary | ICD-10-CM

## 2022-05-26 DIAGNOSIS — I10 ESSENTIAL HYPERTENSION: ICD-10-CM

## 2022-05-26 PROCEDURE — 99396 PREV VISIT EST AGE 40-64: CPT | Performed by: FAMILY MEDICINE

## 2022-05-26 PROCEDURE — 3008F BODY MASS INDEX DOCD: CPT | Performed by: FAMILY MEDICINE

## 2022-05-26 PROCEDURE — 3078F DIAST BP <80 MM HG: CPT | Performed by: FAMILY MEDICINE

## 2022-05-26 PROCEDURE — 3074F SYST BP LT 130 MM HG: CPT | Performed by: FAMILY MEDICINE

## 2022-05-26 RX ORDER — FEXOFENADINE HYDROCHLORIDE 60 MG/1
TABLET, FILM COATED ORAL
COMMUNITY
Start: 2022-04-01

## 2022-05-26 RX ORDER — FLUOROMETHOLONE 0.1 %
1 SUSPENSION, DROPS(FINAL DOSAGE FORM)(ML) OPHTHALMIC (EYE) 2 TIMES DAILY
COMMUNITY
Start: 2022-04-26

## 2022-05-26 RX ORDER — METOPROLOL SUCCINATE 25 MG/1
25 TABLET, EXTENDED RELEASE ORAL DAILY
Qty: 90 TABLET | Refills: 1 | Status: SHIPPED | OUTPATIENT
Start: 2022-05-26

## 2022-05-26 RX ORDER — ESOMEPRAZOLE MAGNESIUM 20 MG/1
FOR SUSPENSION ORAL
COMMUNITY
Start: 2022-03-01

## 2022-05-27 ENCOUNTER — LAB ENCOUNTER (OUTPATIENT)
Dept: LAB | Age: 52
End: 2022-05-27
Attending: FAMILY MEDICINE
Payer: COMMERCIAL

## 2022-05-27 DIAGNOSIS — Z00.00 ROUTINE GENERAL MEDICAL EXAMINATION AT A HEALTH CARE FACILITY: ICD-10-CM

## 2022-05-27 LAB
ALBUMIN SERPL-MCNC: 3.6 G/DL (ref 3.4–5)
ALBUMIN/GLOB SERPL: 0.9 {RATIO} (ref 1–2)
ALP LIVER SERPL-CCNC: 58 U/L
ALT SERPL-CCNC: 49 U/L
ANION GAP SERPL CALC-SCNC: 6 MMOL/L (ref 0–18)
AST SERPL-CCNC: 27 U/L (ref 15–37)
BASOPHILS # BLD AUTO: 0.06 X10(3) UL (ref 0–0.2)
BASOPHILS NFR BLD AUTO: 1 %
BILIRUB SERPL-MCNC: 0.6 MG/DL (ref 0.1–2)
BILIRUB UR QL: NEGATIVE
BUN BLD-MCNC: 10 MG/DL (ref 7–18)
BUN/CREAT SERPL: 10 (ref 10–20)
CALCIUM BLD-MCNC: 9.2 MG/DL (ref 8.5–10.1)
CHLORIDE SERPL-SCNC: 105 MMOL/L (ref 98–112)
CHOLEST SERPL-MCNC: 152 MG/DL (ref ?–200)
CO2 SERPL-SCNC: 28 MMOL/L (ref 21–32)
COLOR UR: YELLOW
COMPLEXED PSA SERPL-MCNC: 0.8 NG/ML (ref ?–4)
CREAT BLD-MCNC: 1 MG/DL
DEPRECATED RDW RBC AUTO: 42.2 FL (ref 35.1–46.3)
EOSINOPHIL # BLD AUTO: 0.35 X10(3) UL (ref 0–0.7)
EOSINOPHIL NFR BLD AUTO: 5.9 %
ERYTHROCYTE [DISTWIDTH] IN BLOOD BY AUTOMATED COUNT: 13 % (ref 11–15)
FASTING PATIENT LIPID ANSWER: YES
FASTING STATUS PATIENT QL REPORTED: YES
GLOBULIN PLAS-MCNC: 3.9 G/DL (ref 2.8–4.4)
GLUCOSE BLD-MCNC: 105 MG/DL (ref 70–99)
GLUCOSE UR-MCNC: NEGATIVE MG/DL
HCT VFR BLD AUTO: 54.2 %
HDLC SERPL-MCNC: 43 MG/DL (ref 40–59)
HGB BLD-MCNC: 17.5 G/DL
HGB UR QL STRIP.AUTO: NEGATIVE
IMM GRANULOCYTES # BLD AUTO: 0.01 X10(3) UL (ref 0–1)
IMM GRANULOCYTES NFR BLD: 0.2 %
KETONES UR-MCNC: NEGATIVE MG/DL
LDLC SERPL CALC-MCNC: 87 MG/DL (ref ?–100)
LEUKOCYTE ESTERASE UR QL STRIP.AUTO: NEGATIVE
LYMPHOCYTES # BLD AUTO: 2.63 X10(3) UL (ref 1–4)
LYMPHOCYTES NFR BLD AUTO: 44.1 %
MCH RBC QN AUTO: 28.5 PG (ref 26–34)
MCHC RBC AUTO-ENTMCNC: 32.3 G/DL (ref 31–37)
MCV RBC AUTO: 88.4 FL
MONOCYTES # BLD AUTO: 0.6 X10(3) UL (ref 0.1–1)
MONOCYTES NFR BLD AUTO: 10.1 %
NEUTROPHILS # BLD AUTO: 2.32 X10 (3) UL (ref 1.5–7.7)
NEUTROPHILS # BLD AUTO: 2.32 X10(3) UL (ref 1.5–7.7)
NEUTROPHILS NFR BLD AUTO: 38.7 %
NITRITE UR QL STRIP.AUTO: NEGATIVE
NONHDLC SERPL-MCNC: 109 MG/DL (ref ?–130)
OSMOLALITY SERPL CALC.SUM OF ELEC: 287 MOSM/KG (ref 275–295)
PH UR: 6 [PH] (ref 5–8)
PLATELET # BLD AUTO: 225 10(3)UL (ref 150–450)
POTASSIUM SERPL-SCNC: 3.8 MMOL/L (ref 3.5–5.1)
PROT SERPL-MCNC: 7.5 G/DL (ref 6.4–8.2)
PROT UR-MCNC: NEGATIVE MG/DL
RBC # BLD AUTO: 6.13 X10(6)UL
SODIUM SERPL-SCNC: 139 MMOL/L (ref 136–145)
SP GR UR STRIP: 1.02 (ref 1–1.03)
TRIGL SERPL-MCNC: 124 MG/DL (ref 30–149)
UROBILINOGEN UR STRIP-ACNC: <2
VIT C UR-MCNC: NEGATIVE MG/DL
VLDLC SERPL CALC-MCNC: 20 MG/DL (ref 0–30)
WBC # BLD AUTO: 6 X10(3) UL (ref 4–11)

## 2022-05-27 PROCEDURE — 80061 LIPID PANEL: CPT

## 2022-05-27 PROCEDURE — 81003 URINALYSIS AUTO W/O SCOPE: CPT

## 2022-05-27 PROCEDURE — 80053 COMPREHEN METABOLIC PANEL: CPT

## 2022-05-27 PROCEDURE — 85025 COMPLETE CBC W/AUTO DIFF WBC: CPT

## 2022-05-27 PROCEDURE — 36415 COLL VENOUS BLD VENIPUNCTURE: CPT

## 2022-06-17 RX ORDER — METOPROLOL SUCCINATE 25 MG/1
25 TABLET, EXTENDED RELEASE ORAL DAILY
Qty: 90 TABLET | Refills: 1 | OUTPATIENT
Start: 2022-06-17

## 2022-12-02 ENCOUNTER — HOSPITAL ENCOUNTER (OUTPATIENT)
Age: 52
Discharge: HOME | End: 2022-12-02
Payer: COMMERCIAL

## 2022-12-02 VITALS
RESPIRATION RATE: 18 BRPM | SYSTOLIC BLOOD PRESSURE: 151 MMHG | DIASTOLIC BLOOD PRESSURE: 94 MMHG | OXYGEN SATURATION: 96 % | HEART RATE: 97 BPM | TEMPERATURE: 98 F

## 2022-12-02 DIAGNOSIS — J01.40 ACUTE NON-RECURRENT PANSINUSITIS: Primary | ICD-10-CM

## 2022-12-02 PROCEDURE — 99213 OFFICE O/P EST LOW 20 MIN: CPT

## 2022-12-02 RX ORDER — DOXYCYCLINE HYCLATE 100 MG/1
100 CAPSULE ORAL 2 TIMES DAILY
Qty: 20 CAPSULE | Refills: 0 | Status: SHIPPED | OUTPATIENT
Start: 2022-12-02 | End: 2022-12-12

## 2022-12-02 NOTE — ED INITIAL ASSESSMENT (HPI)
PATIENT ARRIVED AMBULATORY TO ROOM C/O SYMPTOMS THAT STARTED 10 DAYS AGO. +NASAL CONGESTION. +COUGH +BILATERAL EAR PRESSURE. NO FEVERS.  NO N/V/D. NEGATIVE HOME COVID TEST

## 2022-12-17 ENCOUNTER — HOSPITAL ENCOUNTER (EMERGENCY)
Facility: HOSPITAL | Age: 52
Discharge: LEFT BEFORE DISCHARGE | End: 2022-12-17
Payer: COMMERCIAL

## 2022-12-17 VITALS
RESPIRATION RATE: 20 BRPM | HEIGHT: 69 IN | HEART RATE: 83 BPM | BODY MASS INDEX: 31.84 KG/M2 | DIASTOLIC BLOOD PRESSURE: 92 MMHG | OXYGEN SATURATION: 96 % | WEIGHT: 215 LBS | SYSTOLIC BLOOD PRESSURE: 156 MMHG | TEMPERATURE: 98 F

## 2022-12-17 NOTE — ED INITIAL ASSESSMENT (HPI)
Patient arrives ambulatory through triage with complaints of weakness, fatigue, \"my legs feel like spaghetti\" x1 day. Patient reports COVID+ 2 weeks ago.

## 2022-12-19 ENCOUNTER — TELEPHONE (OUTPATIENT)
Dept: FAMILY MEDICINE CLINIC | Facility: CLINIC | Age: 52
End: 2022-12-19

## 2022-12-19 NOTE — TELEPHONE ENCOUNTER
Patient seen in [address] [place] [address] 2 weeks ago, positive covid. Treated for cough. Still feels congested, weakness and fatigue. BP has been running 150/90. Denies chest pain or shortness of breath. Denies dizziness or lightheadedness. Acute follow up scheduled 12/20.

## 2022-12-20 ENCOUNTER — OFFICE VISIT (OUTPATIENT)
Dept: FAMILY MEDICINE CLINIC | Facility: CLINIC | Age: 52
End: 2022-12-20
Payer: COMMERCIAL

## 2022-12-20 ENCOUNTER — LAB ENCOUNTER (OUTPATIENT)
Dept: LAB | Age: 52
End: 2022-12-20
Attending: FAMILY MEDICINE
Payer: COMMERCIAL

## 2022-12-20 VITALS
WEIGHT: 217.81 LBS | RESPIRATION RATE: 16 BRPM | SYSTOLIC BLOOD PRESSURE: 159 MMHG | HEIGHT: 69 IN | BODY MASS INDEX: 32.26 KG/M2 | DIASTOLIC BLOOD PRESSURE: 90 MMHG | HEART RATE: 80 BPM | OXYGEN SATURATION: 98 % | TEMPERATURE: 98 F

## 2022-12-20 DIAGNOSIS — I10 ESSENTIAL HYPERTENSION: ICD-10-CM

## 2022-12-20 DIAGNOSIS — I10 ESSENTIAL HYPERTENSION: Primary | ICD-10-CM

## 2022-12-20 DIAGNOSIS — U07.1 COVID-19: ICD-10-CM

## 2022-12-20 LAB
ANION GAP SERPL CALC-SCNC: 7 MMOL/L (ref 0–18)
BASOPHILS # BLD AUTO: 0.07 X10(3) UL (ref 0–0.2)
BASOPHILS NFR BLD AUTO: 1.1 %
BUN BLD-MCNC: 13 MG/DL (ref 7–18)
BUN/CREAT SERPL: 12.9 (ref 10–20)
CALCIUM BLD-MCNC: 9.7 MG/DL (ref 8.5–10.1)
CHLORIDE SERPL-SCNC: 106 MMOL/L (ref 98–112)
CO2 SERPL-SCNC: 28 MMOL/L (ref 21–32)
CREAT BLD-MCNC: 1.01 MG/DL
D DIMER PPP FEU-MCNC: <0.27 UG/ML FEU (ref ?–0.52)
DEPRECATED RDW RBC AUTO: 43.4 FL (ref 35.1–46.3)
EOSINOPHIL # BLD AUTO: 0.33 X10(3) UL (ref 0–0.7)
EOSINOPHIL NFR BLD AUTO: 5.1 %
ERYTHROCYTE [DISTWIDTH] IN BLOOD BY AUTOMATED COUNT: 15.6 % (ref 11–15)
FASTING STATUS PATIENT QL REPORTED: YES
GFR SERPLBLD BASED ON 1.73 SQ M-ARVRAT: 89 ML/MIN/1.73M2 (ref 60–?)
GLUCOSE BLD-MCNC: 100 MG/DL (ref 70–99)
HCT VFR BLD AUTO: 51.6 %
HGB BLD-MCNC: 15.7 G/DL
IMM GRANULOCYTES # BLD AUTO: 0.01 X10(3) UL (ref 0–1)
IMM GRANULOCYTES NFR BLD: 0.2 %
LYMPHOCYTES # BLD AUTO: 2.38 X10(3) UL (ref 1–4)
LYMPHOCYTES NFR BLD AUTO: 36.8 %
MCH RBC QN AUTO: 24.3 PG (ref 26–34)
MCHC RBC AUTO-ENTMCNC: 30.4 G/DL (ref 31–37)
MCV RBC AUTO: 80 FL
MONOCYTES # BLD AUTO: 0.54 X10(3) UL (ref 0.1–1)
MONOCYTES NFR BLD AUTO: 8.3 %
NEUTROPHILS # BLD AUTO: 3.14 X10 (3) UL (ref 1.5–7.7)
NEUTROPHILS # BLD AUTO: 3.14 X10(3) UL (ref 1.5–7.7)
NEUTROPHILS NFR BLD AUTO: 48.5 %
OSMOLALITY SERPL CALC.SUM OF ELEC: 292 MOSM/KG (ref 275–295)
PLATELET # BLD AUTO: 336 10(3)UL (ref 150–450)
POTASSIUM SERPL-SCNC: 4.3 MMOL/L (ref 3.5–5.1)
RBC # BLD AUTO: 6.45 X10(6)UL
SODIUM SERPL-SCNC: 141 MMOL/L (ref 136–145)
WBC # BLD AUTO: 6.5 X10(3) UL (ref 4–11)

## 2022-12-20 PROCEDURE — 99214 OFFICE O/P EST MOD 30 MIN: CPT | Performed by: FAMILY MEDICINE

## 2022-12-20 PROCEDURE — 85379 FIBRIN DEGRADATION QUANT: CPT

## 2022-12-20 PROCEDURE — 80048 BASIC METABOLIC PNL TOTAL CA: CPT

## 2022-12-20 PROCEDURE — 36415 COLL VENOUS BLD VENIPUNCTURE: CPT

## 2022-12-20 PROCEDURE — 85025 COMPLETE CBC W/AUTO DIFF WBC: CPT

## 2022-12-20 PROCEDURE — 3008F BODY MASS INDEX DOCD: CPT | Performed by: FAMILY MEDICINE

## 2022-12-20 PROCEDURE — 3080F DIAST BP >= 90 MM HG: CPT | Performed by: FAMILY MEDICINE

## 2022-12-20 PROCEDURE — 3077F SYST BP >= 140 MM HG: CPT | Performed by: FAMILY MEDICINE

## 2023-01-04 ENCOUNTER — NURSE TRIAGE (OUTPATIENT)
Dept: INTERNAL MEDICINE CLINIC | Facility: CLINIC | Age: 53
End: 2023-01-04

## 2023-01-05 ENCOUNTER — OFFICE VISIT (OUTPATIENT)
Dept: FAMILY MEDICINE CLINIC | Facility: CLINIC | Age: 53
End: 2023-01-05
Payer: COMMERCIAL

## 2023-01-05 ENCOUNTER — LAB ENCOUNTER (OUTPATIENT)
Dept: LAB | Age: 53
End: 2023-01-05
Attending: FAMILY MEDICINE
Payer: COMMERCIAL

## 2023-01-05 VITALS
BODY MASS INDEX: 32.58 KG/M2 | HEIGHT: 69 IN | OXYGEN SATURATION: 98 % | TEMPERATURE: 97 F | SYSTOLIC BLOOD PRESSURE: 147 MMHG | DIASTOLIC BLOOD PRESSURE: 91 MMHG | HEART RATE: 95 BPM | WEIGHT: 220 LBS

## 2023-01-05 DIAGNOSIS — R53.83 OTHER FATIGUE: ICD-10-CM

## 2023-01-05 DIAGNOSIS — Z86.16 HISTORY OF COVID-19: Primary | ICD-10-CM

## 2023-01-05 DIAGNOSIS — Z86.16 HISTORY OF COVID-19: ICD-10-CM

## 2023-01-05 LAB — TSI SER-ACNC: 2.17 MIU/ML (ref 0.36–3.74)

## 2023-01-05 PROCEDURE — 3077F SYST BP >= 140 MM HG: CPT | Performed by: FAMILY MEDICINE

## 2023-01-05 PROCEDURE — 84443 ASSAY THYROID STIM HORMONE: CPT

## 2023-01-05 PROCEDURE — 36415 COLL VENOUS BLD VENIPUNCTURE: CPT

## 2023-01-05 PROCEDURE — 99213 OFFICE O/P EST LOW 20 MIN: CPT | Performed by: FAMILY MEDICINE

## 2023-01-05 PROCEDURE — 3080F DIAST BP >= 90 MM HG: CPT | Performed by: FAMILY MEDICINE

## 2023-01-05 PROCEDURE — 3008F BODY MASS INDEX DOCD: CPT | Performed by: FAMILY MEDICINE

## 2023-01-12 ENCOUNTER — LAB ENCOUNTER (OUTPATIENT)
Dept: LAB | Age: 53
End: 2023-01-12
Attending: FAMILY MEDICINE
Payer: COMMERCIAL

## 2023-01-12 ENCOUNTER — OFFICE VISIT (OUTPATIENT)
Dept: FAMILY MEDICINE CLINIC | Facility: CLINIC | Age: 53
End: 2023-01-12

## 2023-01-12 VITALS
DIASTOLIC BLOOD PRESSURE: 82 MMHG | HEIGHT: 69 IN | SYSTOLIC BLOOD PRESSURE: 140 MMHG | WEIGHT: 216.19 LBS | TEMPERATURE: 98 F | OXYGEN SATURATION: 98 % | HEART RATE: 80 BPM | BODY MASS INDEX: 32.02 KG/M2

## 2023-01-12 DIAGNOSIS — R53.83 OTHER FATIGUE: ICD-10-CM

## 2023-01-12 DIAGNOSIS — I10 ESSENTIAL HYPERTENSION: ICD-10-CM

## 2023-01-12 DIAGNOSIS — R53.83 OTHER FATIGUE: Primary | ICD-10-CM

## 2023-01-12 PROBLEM — G93.39 OTHER POST INFECTION AND RELATED FATIGUE SYNDROMES: Status: ACTIVE | Noted: 2023-01-12

## 2023-01-12 LAB
ERYTHROCYTE [SEDIMENTATION RATE] IN BLOOD: 36 MM/HR
HCT VFR BLD AUTO: 49.8 %
HGB BLD-MCNC: 15.1 G/DL

## 2023-01-12 PROCEDURE — 3008F BODY MASS INDEX DOCD: CPT | Performed by: FAMILY MEDICINE

## 2023-01-12 PROCEDURE — 36415 COLL VENOUS BLD VENIPUNCTURE: CPT | Performed by: FAMILY MEDICINE

## 2023-01-12 PROCEDURE — 3079F DIAST BP 80-89 MM HG: CPT | Performed by: FAMILY MEDICINE

## 2023-01-12 PROCEDURE — 85652 RBC SED RATE AUTOMATED: CPT

## 2023-01-12 PROCEDURE — 85014 HEMATOCRIT: CPT | Performed by: FAMILY MEDICINE

## 2023-01-12 PROCEDURE — 3077F SYST BP >= 140 MM HG: CPT | Performed by: FAMILY MEDICINE

## 2023-01-12 PROCEDURE — 85018 HEMOGLOBIN: CPT | Performed by: FAMILY MEDICINE

## 2023-01-12 PROCEDURE — 99214 OFFICE O/P EST MOD 30 MIN: CPT | Performed by: FAMILY MEDICINE

## 2023-01-18 DIAGNOSIS — R53.83 OTHER FATIGUE: Primary | ICD-10-CM

## 2023-02-03 ENCOUNTER — HOSPITAL ENCOUNTER (OUTPATIENT)
Dept: GENERAL RADIOLOGY | Facility: HOSPITAL | Age: 53
Discharge: HOME | End: 2023-02-03
Attending: INTERNAL MEDICINE
Payer: COMMERCIAL

## 2023-02-03 ENCOUNTER — LAB ENCOUNTER (OUTPATIENT)
Dept: LAB | Age: 53
End: 2023-02-03
Attending: INTERNAL MEDICINE
Payer: COMMERCIAL

## 2023-02-03 DIAGNOSIS — I10 HTN (HYPERTENSION): Primary | ICD-10-CM

## 2023-02-03 DIAGNOSIS — R09.89 PULSE IRREGULARITY: ICD-10-CM

## 2023-02-03 DIAGNOSIS — I10 HTN (HYPERTENSION): ICD-10-CM

## 2023-02-03 DIAGNOSIS — R29.898 LEG WEAKNESS: ICD-10-CM

## 2023-02-03 LAB
CK SERPL-CCNC: 71 U/L
DEPRECATED HBV CORE AB SER IA-ACNC: 6.6 NG/ML
IRON SATN MFR SERPL: 5 %
IRON SERPL-MCNC: 25 UG/DL
TIBC SERPL-MCNC: 508 UG/DL (ref 240–450)
TRANSFERRIN SERPL-MCNC: 341 MG/DL (ref 200–360)
URATE SERPL-MCNC: 6 MG/DL

## 2023-02-03 PROCEDURE — 36415 COLL VENOUS BLD VENIPUNCTURE: CPT

## 2023-02-03 PROCEDURE — 82550 ASSAY OF CK (CPK): CPT

## 2023-02-03 PROCEDURE — 83874 ASSAY OF MYOGLOBIN: CPT

## 2023-02-03 PROCEDURE — 82728 ASSAY OF FERRITIN: CPT

## 2023-02-03 PROCEDURE — 83540 ASSAY OF IRON: CPT

## 2023-02-03 PROCEDURE — 84466 ASSAY OF TRANSFERRIN: CPT

## 2023-02-03 PROCEDURE — 84550 ASSAY OF BLOOD/URIC ACID: CPT

## 2023-02-03 PROCEDURE — 72100 X-RAY EXAM L-S SPINE 2/3 VWS: CPT | Performed by: INTERNAL MEDICINE

## 2023-02-06 LAB — MYOGLOBIN, SERUM: 30 NG/ML

## 2023-02-07 ENCOUNTER — PATIENT MESSAGE (OUTPATIENT)
Dept: FAMILY MEDICINE CLINIC | Facility: CLINIC | Age: 53
End: 2023-02-07

## 2023-02-08 ENCOUNTER — LAB ENCOUNTER (OUTPATIENT)
Dept: LAB | Age: 53
End: 2023-02-08
Attending: FAMILY MEDICINE
Payer: COMMERCIAL

## 2023-02-08 DIAGNOSIS — R53.83 OTHER FATIGUE: ICD-10-CM

## 2023-02-08 LAB — ERYTHROCYTE [SEDIMENTATION RATE] IN BLOOD: 10 MM/HR

## 2023-02-08 PROCEDURE — 36415 COLL VENOUS BLD VENIPUNCTURE: CPT

## 2023-02-08 PROCEDURE — 85652 RBC SED RATE AUTOMATED: CPT

## 2023-02-08 NOTE — TELEPHONE ENCOUNTER
Dr Cari Eller  =see message below, thanks     From: Cuca Nieto  To: Patric Wagoner DO  Sent: 2/7/2023 10:47 AM CST  Subject: Iron labs    I have been seeing Dr Cari Eller for post Covid leg weakness and fatigue. My cardiologist ordered some iron labs during a visit with him after I told him my hemorrhoids were bleeding a lot. They show very low iron levels. I'd like Dr. Cari Eller to look and tell me if I need to see a hematologist for this or if he will treat it. I'd like to start treatment asap as I have been dealing with this for two months now and this seems to be a new finding for how terrible I have been feeling.

## 2023-02-09 ENCOUNTER — OFFICE VISIT (OUTPATIENT)
Dept: FAMILY MEDICINE CLINIC | Facility: CLINIC | Age: 53
End: 2023-02-09

## 2023-02-09 VITALS
DIASTOLIC BLOOD PRESSURE: 82 MMHG | HEIGHT: 69 IN | OXYGEN SATURATION: 98 % | HEART RATE: 72 BPM | BODY MASS INDEX: 31.7 KG/M2 | WEIGHT: 214 LBS | SYSTOLIC BLOOD PRESSURE: 140 MMHG

## 2023-02-09 DIAGNOSIS — E61.1 IRON DEFICIENCY: ICD-10-CM

## 2023-02-09 DIAGNOSIS — I10 ESSENTIAL HYPERTENSION: Primary | ICD-10-CM

## 2023-02-09 DIAGNOSIS — K64.8 INTERNAL HEMORRHOIDS: ICD-10-CM

## 2023-02-09 PROCEDURE — 3079F DIAST BP 80-89 MM HG: CPT | Performed by: FAMILY MEDICINE

## 2023-02-09 PROCEDURE — 3077F SYST BP >= 140 MM HG: CPT | Performed by: FAMILY MEDICINE

## 2023-02-09 PROCEDURE — 99214 OFFICE O/P EST MOD 30 MIN: CPT | Performed by: FAMILY MEDICINE

## 2023-02-09 PROCEDURE — 3008F BODY MASS INDEX DOCD: CPT | Performed by: FAMILY MEDICINE

## 2023-02-22 ENCOUNTER — OFFICE VISIT (OUTPATIENT)
Dept: HEMATOLOGY/ONCOLOGY | Facility: HOSPITAL | Age: 53
End: 2023-02-22
Attending: INTERNAL MEDICINE
Payer: COMMERCIAL

## 2023-02-22 VITALS
BODY MASS INDEX: 30.21 KG/M2 | HEIGHT: 70 IN | WEIGHT: 211 LBS | HEART RATE: 72 BPM | SYSTOLIC BLOOD PRESSURE: 131 MMHG | TEMPERATURE: 99 F | RESPIRATION RATE: 18 BRPM | OXYGEN SATURATION: 98 % | DIASTOLIC BLOOD PRESSURE: 73 MMHG

## 2023-02-22 DIAGNOSIS — Z86.2 HISTORY OF ANEMIA: ICD-10-CM

## 2023-02-22 DIAGNOSIS — K92.2 GASTROINTESTINAL HEMORRHAGE, UNSPECIFIED GASTROINTESTINAL HEMORRHAGE TYPE: ICD-10-CM

## 2023-02-22 DIAGNOSIS — K64.9 HEMORRHOIDS, UNSPECIFIED HEMORRHOID TYPE: ICD-10-CM

## 2023-02-22 DIAGNOSIS — E61.1 IRON DEFICIENCY: Primary | ICD-10-CM

## 2023-02-22 DIAGNOSIS — D75.1 ERYTHROCYTOSIS: ICD-10-CM

## 2023-02-22 PROCEDURE — 99244 OFF/OP CNSLTJ NEW/EST MOD 40: CPT | Performed by: INTERNAL MEDICINE

## 2023-02-22 RX ORDER — CETIRIZINE HYDROCHLORIDE 10 MG/1
10 CAPSULE, LIQUID FILLED ORAL DAILY
COMMUNITY
Start: 2023-01-01

## 2023-02-23 ENCOUNTER — TELEPHONE (OUTPATIENT)
Dept: HEMATOLOGY/ONCOLOGY | Facility: HOSPITAL | Age: 53
End: 2023-02-23

## 2023-02-23 NOTE — CONSULTS
[facility]    PATIENT'S NAME: Leisa Mann   CONSULTING PHYSICIAN: Romeo Bowen. Melina Wilder MD   PATIENT ACCOUNT #: [de-identified] LOCATION: [address] [place] [address] RECORD #: [id number] YOB: 1970   CONSULTATION DATE: 02/22/2023       [facility] NEW PATIENT CONSULT    REQUESTING PHYSICIANS:  Grace Francisco DO and Paulo Riddle. Юлия Adams MD      REASON FOR CONSULTATION:  Iron deficiency, history of anemia, hemorrhoids, chronic GI blood loss. HISTORY OF PRESENT ILLNESS:  The patient is a pleasant 27-year-old male being evaluated by Hematology for iron deficiency. He has a history of anemia, hemorrhoids, chronic GI blood loss. He also has had erythrocytosis in the past.  Labs as of 02/03/2023 showed his ferritin to be 6, iron saturation 5%. His hemoglobin on 01/12/2023 was 15.1. Over the last several years, the patient has at times had erythrocytosis with a hemoglobin greater than 18. He has a sleep study upcoming; however, has not yet have a diagnosis of sleep apnea. He has followed with General Surgery for treatment of his hemorrhoids and has undergone banding procedures. He feels very fatigued overall. He feels this more so in the morning with generalized muscle weakness. He denies any other sites of bruising or bleeding. He denies any focal neurological deficits. PAST MEDICAL HISTORY:  Hypertension, hemorrhoids, history of anemia and erythrocytosis, iron deficiency as noted. MEDICATIONS:  Iron, cetirizine, metoprolol, esomeprazole. ALLERGIES:  Amoxicillin and sulfa. SOCIAL HISTORY:  He does not use any alcohol, tobacco, or illicit drug use. He stopped drinking recently. FAMILY MEDICAL HISTORY:  No reported history of any hematologic disorders in the family, specifically mother and father. REVIEW OF SYSTEMS:  All other systems reviewed, negative x12. PHYSICAL EXAMINATION:    GENERAL:  No acute distress. Alert and oriented.   VITAL SIGNS:  ECOG performance status is 0. Weight is 95 kg. Blood pressure is 131/73, pulse 72, respiratory rate 18, pulse ox 98% on room air, temperature 36.9 Celsius. HEENT:  Moist mucous membranes. Oropharynx clear. NECK:  Supple. LUNGS:  Symmetric expansion. HEART:  Good distal pulses. ABDOMEN:  Soft. EXTREMITIES:  No edema. SKIN:  No visible lesions. LYMPHATICS:  No visible adenopathy. NEUROLOGIC:  Moving all extremities. Cranial nerves intact. PSYCHIATRIC:  Appropriate mood, appropriate affect. MUSCULOSKELETAL:  No deformity. LABORATORY DATA:  Extensively reviewed as per HPI. Please see above for details. ASSESSMENT AND PLAN:  The patient is a 55-year-old male with chronic blood loss from hemorrhoids. He has a history of anemia and erythrocytosis. He currently has severe iron deficiency. Given his previous erythrocytosis and current normal hemoglobin, he may have continued relative tissue hypoxia (possibly undiagnosed sleep apnea). He is in the process of being set up for a sleep study. Given this possibility, we will go ahead and give him an IV iron infusion to assess where his hemoglobin normalizes with adequate iron stores. We will see him back in clinic to assess response. Thank you very much for this consultation request and allowing us to participate in the care of this delightful patient. Dictated By Bam Marin MD  d: 02/22/2023 16:35:56  t: 02/22/2023 21:45:31  Job [id number]  BDO/    cc: DO Carol Stapleton.  Alan Irvin MD

## 2023-02-28 ENCOUNTER — LAB ENCOUNTER (OUTPATIENT)
Dept: LAB | Age: 53
End: 2023-02-28
Attending: FAMILY MEDICINE
Payer: COMMERCIAL

## 2023-02-28 DIAGNOSIS — E61.1 IRON DEFICIENCY: ICD-10-CM

## 2023-02-28 LAB
DEPRECATED HBV CORE AB SER IA-ACNC: 56.7 NG/ML
HCT VFR BLD AUTO: 54.7 %
HGB BLD-MCNC: 16.8 G/DL
IRON SATN MFR SERPL: 70 %
IRON SERPL-MCNC: 328 UG/DL
TIBC SERPL-MCNC: 471 UG/DL (ref 240–450)
TRANSFERRIN SERPL-MCNC: 316 MG/DL (ref 200–360)

## 2023-02-28 PROCEDURE — 36415 COLL VENOUS BLD VENIPUNCTURE: CPT | Performed by: FAMILY MEDICINE

## 2023-02-28 PROCEDURE — 82728 ASSAY OF FERRITIN: CPT

## 2023-02-28 PROCEDURE — 84466 ASSAY OF TRANSFERRIN: CPT

## 2023-02-28 PROCEDURE — 85018 HEMOGLOBIN: CPT | Performed by: FAMILY MEDICINE

## 2023-02-28 PROCEDURE — 83540 ASSAY OF IRON: CPT

## 2023-02-28 PROCEDURE — 85014 HEMATOCRIT: CPT | Performed by: FAMILY MEDICINE

## 2023-03-01 ENCOUNTER — ORDER TRANSCRIPTION (OUTPATIENT)
Dept: SLEEP CENTER | Age: 53
End: 2023-03-01

## 2023-03-01 ENCOUNTER — TELEPHONE (OUTPATIENT)
Dept: HEMATOLOGY/ONCOLOGY | Facility: HOSPITAL | Age: 53
End: 2023-03-01

## 2023-03-01 DIAGNOSIS — R09.89 PULSE IRREGULARITY: Primary | ICD-10-CM

## 2023-03-01 DIAGNOSIS — R06.83 SNORES: ICD-10-CM

## 2023-03-01 NOTE — TELEPHONE ENCOUNTER
Patient called. He is concerned about his lab results he just received and is not sure he should come in for his infusion tomorrow. Please call back. Thank you.

## 2023-03-01 NOTE — TELEPHONE ENCOUNTER
Amanda Gleason said that Dr Zelda Ivan had him taking oral iron for the last 25 days. Amanda Gleason did take iron in the morning. Amanda Gleason decided to get his labs drawn yesterday afternoon to see where his levels were at before he had his infusion tomorrow. Amanda Gleason is concerned that if he gets the iron infusion tomorrow it could hurt him. He wants to know if he still needs the infusion?

## 2023-03-01 NOTE — TELEPHONE ENCOUNTER
Shahab Howell said Dr Shiva Burns had him taking 45 mg of elemental iron Blue Box. I told him that he won't be getting his iron infusion tomorrow. He needs to hold the oral iron. In 3 to 4 weeks he will get his labs redrawn. He verbalized understanding to stop the oral iron and get labs drawn in 3 to 4 weeks.

## 2023-03-02 ENCOUNTER — OFFICE VISIT (OUTPATIENT)
Dept: FAMILY MEDICINE CLINIC | Facility: CLINIC | Age: 53
End: 2023-03-02

## 2023-03-02 ENCOUNTER — LAB ENCOUNTER (OUTPATIENT)
Dept: LAB | Age: 53
End: 2023-03-02
Attending: FAMILY MEDICINE
Payer: COMMERCIAL

## 2023-03-02 ENCOUNTER — APPOINTMENT (OUTPATIENT)
Dept: HEMATOLOGY/ONCOLOGY | Facility: HOSPITAL | Age: 53
End: 2023-03-02
Attending: INTERNAL MEDICINE
Payer: COMMERCIAL

## 2023-03-02 VITALS
HEART RATE: 69 BPM | WEIGHT: 211.63 LBS | OXYGEN SATURATION: 98 % | DIASTOLIC BLOOD PRESSURE: 80 MMHG | BODY MASS INDEX: 30.3 KG/M2 | HEIGHT: 70 IN | SYSTOLIC BLOOD PRESSURE: 133 MMHG

## 2023-03-02 DIAGNOSIS — E61.1 IRON DEFICIENCY: Primary | ICD-10-CM

## 2023-03-02 DIAGNOSIS — R53.83 OTHER FATIGUE: ICD-10-CM

## 2023-03-02 DIAGNOSIS — M79.10 MYALGIA: ICD-10-CM

## 2023-03-02 DIAGNOSIS — E61.1 IRON DEFICIENCY: ICD-10-CM

## 2023-03-02 LAB
VIT B12 SERPL-MCNC: 548 PG/ML (ref 193–986)
VIT D+METAB SERPL-MCNC: 19.3 NG/ML (ref 30–100)

## 2023-03-02 PROCEDURE — 99214 OFFICE O/P EST MOD 30 MIN: CPT | Performed by: FAMILY MEDICINE

## 2023-03-02 PROCEDURE — 82306 VITAMIN D 25 HYDROXY: CPT

## 2023-03-02 PROCEDURE — 36415 COLL VENOUS BLD VENIPUNCTURE: CPT

## 2023-03-02 PROCEDURE — 3079F DIAST BP 80-89 MM HG: CPT | Performed by: FAMILY MEDICINE

## 2023-03-02 PROCEDURE — 3008F BODY MASS INDEX DOCD: CPT | Performed by: FAMILY MEDICINE

## 2023-03-02 PROCEDURE — 82607 VITAMIN B-12: CPT

## 2023-03-02 PROCEDURE — 3075F SYST BP GE 130 - 139MM HG: CPT | Performed by: FAMILY MEDICINE

## 2023-03-07 ENCOUNTER — TELEMEDICINE (OUTPATIENT)
Dept: FAMILY MEDICINE CLINIC | Facility: CLINIC | Age: 53
End: 2023-03-07

## 2023-03-07 DIAGNOSIS — M79.10 MYALGIA: Primary | ICD-10-CM

## 2023-03-07 DIAGNOSIS — R53.83 FATIGUE, UNSPECIFIED TYPE: ICD-10-CM

## 2023-03-07 DIAGNOSIS — F32.89 OTHER DEPRESSION: ICD-10-CM

## 2023-03-07 DIAGNOSIS — U09.9 LONG COVID: ICD-10-CM

## 2023-03-07 PROCEDURE — 99213 OFFICE O/P EST LOW 20 MIN: CPT | Performed by: FAMILY MEDICINE

## 2023-04-01 ENCOUNTER — OFFICE VISIT (OUTPATIENT)
Dept: ALLERGY | Facility: CLINIC | Age: 53
End: 2023-04-01

## 2023-04-01 VITALS — SYSTOLIC BLOOD PRESSURE: 141 MMHG | DIASTOLIC BLOOD PRESSURE: 89 MMHG | OXYGEN SATURATION: 97 % | HEART RATE: 72 BPM

## 2023-04-01 DIAGNOSIS — J30.2 SEASONAL AND PERENNIAL ALLERGIC RHINOCONJUNCTIVITIS: Primary | ICD-10-CM

## 2023-04-01 DIAGNOSIS — J30.89 SEASONAL AND PERENNIAL ALLERGIC RHINOCONJUNCTIVITIS: Primary | ICD-10-CM

## 2023-04-01 DIAGNOSIS — Z92.29 COVID-19 VACCINE SERIES COMPLETED: ICD-10-CM

## 2023-04-01 DIAGNOSIS — H10.10 SEASONAL AND PERENNIAL ALLERGIC RHINOCONJUNCTIVITIS: Primary | ICD-10-CM

## 2023-04-01 DIAGNOSIS — Z23 FLU VACCINE NEED: ICD-10-CM

## 2023-04-01 DIAGNOSIS — L29.9 EAR ITCH: ICD-10-CM

## 2023-04-01 PROCEDURE — 99204 OFFICE O/P NEW MOD 45 MIN: CPT | Performed by: ALLERGY & IMMUNOLOGY

## 2023-04-01 PROCEDURE — 3077F SYST BP >= 140 MM HG: CPT | Performed by: ALLERGY & IMMUNOLOGY

## 2023-04-01 PROCEDURE — 3079F DIAST BP 80-89 MM HG: CPT | Performed by: ALLERGY & IMMUNOLOGY

## 2023-04-01 RX ORDER — ALBUTEROL SULFATE 90 UG/1
2 AEROSOL, METERED RESPIRATORY (INHALATION) EVERY 6 HOURS PRN
Qty: 1 EACH | Refills: 0 | Status: SHIPPED | OUTPATIENT
Start: 2023-04-01

## 2023-04-01 RX ORDER — LEVOCETIRIZINE DIHYDROCHLORIDE 5 MG/1
5 TABLET, FILM COATED ORAL EVERY EVENING
Qty: 90 TABLET | Refills: 1 | Status: SHIPPED | OUTPATIENT
Start: 2023-04-01

## 2023-04-01 RX ORDER — FLUOCINOLONE ACETONIDE 0.11 MG/ML
5 OIL AURICULAR (OTIC) 2 TIMES DAILY PRN
Qty: 20 ML | Refills: 0 | Status: SHIPPED | OUTPATIENT
Start: 2023-04-01

## 2023-04-01 NOTE — PATIENT INSTRUCTIONS
#1 allergic rhinitis  Reviewed avoidance measures and potential treatment option of immunotherapy  Xyzal, levocetirizine 5 mg as an antihistamine once a day up to twice a day if needed  Trial of Derm otic eardrops twice a day  May add Flonase or Nasacort 2 sprays per nostril once a day if having prominent nasal congestion postnasal drip  May consider Derm otic if refractory as an eardrop    #2 COVID vaccinations up-to-date. Recommend booster when indicated    #3 flu vaccine up-to-date    4. Wheezing  Intermittent in nature  Trial of albuterol 2 puffs every 4-6 hours if having active coughing wheezing or shortness of breath  Patient can be posted if using albuterol more than 2 to 3 days a week    Lastly may consider ENT evaluation not improving with above recommendations given his prior MRI of the brain per patient report that showed sinus mucosal thickening in the ethmoids and maxillary sinuses       Orders This Visit:  No orders of the defined types were placed in this encounter. Meds This Visit:  Requested Prescriptions     Signed Prescriptions Disp Refills    levocetirizine 5 MG Oral Tab 90 tablet 1     Sig: Take 1 tablet (5 mg total) by mouth every evening. Fluocinolone Acetonide (DERMOTIC) 0.01 % Otic Oil 20 mL 0     Sig: Place 5 drops in ear(s) 2 (two) times daily as needed. albuterol (PROAIR HFA) 108 (90 Base) MCG/ACT Inhalation Aero Soln 1 each 0     Sig: Inhale 2 puffs into the lungs every 6 (six) hours as needed for Wheezing.        Imaging & Referrals:  None     4/1/2023  Dorys Larson MD

## 2023-04-03 ENCOUNTER — MED REC SCAN ONLY (OUTPATIENT)
Dept: FAMILY MEDICINE CLINIC | Facility: CLINIC | Age: 53
End: 2023-04-03

## 2023-04-04 ENCOUNTER — LAB ENCOUNTER (OUTPATIENT)
Dept: LAB | Facility: REFERENCE LAB | Age: 53
End: 2023-04-04
Attending: INTERNAL MEDICINE
Payer: COMMERCIAL

## 2023-04-04 DIAGNOSIS — E61.1 IRON DEFICIENCY: ICD-10-CM

## 2023-04-04 LAB
BASOPHILS # BLD AUTO: 0.06 X10(3) UL (ref 0–0.2)
BASOPHILS NFR BLD AUTO: 1 %
DEPRECATED HBV CORE AB SER IA-ACNC: 18.7 NG/ML
DEPRECATED RDW RBC AUTO: 59.2 FL (ref 35.1–46.3)
EOSINOPHIL # BLD AUTO: 0.34 X10(3) UL (ref 0–0.7)
EOSINOPHIL NFR BLD AUTO: 5.9 %
ERYTHROCYTE [DISTWIDTH] IN BLOOD BY AUTOMATED COUNT: 21.4 % (ref 11–15)
HCT VFR BLD AUTO: 55.6 %
HGB BLD-MCNC: 17.4 G/DL
IMM GRANULOCYTES # BLD AUTO: 0.01 X10(3) UL (ref 0–1)
IMM GRANULOCYTES NFR BLD: 0.2 %
IRON SATN MFR SERPL: 15 %
IRON SERPL-MCNC: 69 UG/DL
LYMPHOCYTES # BLD AUTO: 1.99 X10(3) UL (ref 1–4)
LYMPHOCYTES NFR BLD AUTO: 34.7 %
MCH RBC QN AUTO: 25.4 PG (ref 26–34)
MCHC RBC AUTO-ENTMCNC: 31.3 G/DL (ref 31–37)
MCV RBC AUTO: 81 FL
MONOCYTES # BLD AUTO: 0.57 X10(3) UL (ref 0.1–1)
MONOCYTES NFR BLD AUTO: 9.9 %
NEUTROPHILS # BLD AUTO: 2.76 X10 (3) UL (ref 1.5–7.7)
NEUTROPHILS # BLD AUTO: 2.76 X10(3) UL (ref 1.5–7.7)
NEUTROPHILS NFR BLD AUTO: 48.3 %
PLATELET # BLD AUTO: 266 10(3)UL (ref 150–450)
RBC # BLD AUTO: 6.86 X10(6)UL
TIBC SERPL-MCNC: 451 UG/DL (ref 240–450)
TRANSFERRIN SERPL-MCNC: 303 MG/DL (ref 200–360)
WBC # BLD AUTO: 5.7 X10(3) UL (ref 4–11)

## 2023-04-04 PROCEDURE — 82728 ASSAY OF FERRITIN: CPT

## 2023-04-04 PROCEDURE — 36415 COLL VENOUS BLD VENIPUNCTURE: CPT

## 2023-04-04 PROCEDURE — 84466 ASSAY OF TRANSFERRIN: CPT

## 2023-04-04 PROCEDURE — 85025 COMPLETE CBC W/AUTO DIFF WBC: CPT

## 2023-04-04 PROCEDURE — 83540 ASSAY OF IRON: CPT

## 2023-04-06 ENCOUNTER — PATIENT MESSAGE (OUTPATIENT)
Dept: FAMILY MEDICINE CLINIC | Facility: CLINIC | Age: 53
End: 2023-04-06

## 2023-04-07 ENCOUNTER — OFFICE VISIT (OUTPATIENT)
Dept: HEMATOLOGY/ONCOLOGY | Facility: HOSPITAL | Age: 53
End: 2023-04-07
Attending: INTERNAL MEDICINE
Payer: COMMERCIAL

## 2023-04-07 VITALS
OXYGEN SATURATION: 98 % | BODY MASS INDEX: 30.35 KG/M2 | TEMPERATURE: 98 F | SYSTOLIC BLOOD PRESSURE: 131 MMHG | WEIGHT: 212 LBS | DIASTOLIC BLOOD PRESSURE: 89 MMHG | HEART RATE: 68 BPM | HEIGHT: 70 IN | RESPIRATION RATE: 18 BRPM

## 2023-04-07 DIAGNOSIS — E61.1 IRON DEFICIENCY: Primary | ICD-10-CM

## 2023-04-07 DIAGNOSIS — D75.1 ERYTHROCYTOSIS: ICD-10-CM

## 2023-04-07 DIAGNOSIS — K64.9 HEMORRHOIDS, UNSPECIFIED HEMORRHOID TYPE: ICD-10-CM

## 2023-04-07 PROCEDURE — 99214 OFFICE O/P EST MOD 30 MIN: CPT | Performed by: INTERNAL MEDICINE

## 2023-04-07 NOTE — TELEPHONE ENCOUNTER
From: Chante Concepcoin  To: Ron Ugalde DO  Sent: 4/6/2023 1:20 PM CDT  Subject: Major Christian,  I am requesting you to send me a letter that I can use to support postponing my jury duty summons I just received. The reason is that I am still suffering from what we think is Long Covid. I am unable to wake in the mornings with any consistency of time and if I wake earlier than my body wants me to, I pay the price and need to sleep. I also am still getting anxiety as a result of this. So committing to a day beginning at [address] [facility] and ending after 4pm right now is not in my best interests, I am not even able to commit to my job for that many hours a day at this point. I'd like to ask for a letter from you that supports delaying the summons by 6-12 months to allow me to continue to recover before serving. I have had many additional visits and tests (see james from [facility]) neurology and rheumatology and they also suspect long covid as the culprit. I have a follow up with you in May. Thanks, please let me know if I need to do a video visit on this.

## 2023-04-26 ENCOUNTER — OFFICE VISIT (OUTPATIENT)
Dept: SLEEP CENTER | Age: 53
End: 2023-04-26
Attending: INTERNAL MEDICINE
Payer: COMMERCIAL

## 2023-04-26 DIAGNOSIS — R09.89 PULSE IRREGULARITY: ICD-10-CM

## 2023-04-26 DIAGNOSIS — R06.83 SNORES: ICD-10-CM

## 2023-04-26 PROCEDURE — 95806 SLEEP STUDY UNATT&RESP EFFT: CPT

## 2023-05-01 NOTE — PROCEDURES
[address] [facility]  Accredited by the [place] of [employer] (AASM)    PATIENT'S NAME: Leisa Mann   ATTENDING PHYSICIAN: Paulo Riddle. Юлия Adams MD   REFERRING PHYSICIAN: Soco Adams MD   PATIENT ACCOUNT #: [de-identified] LOCATION: [address] [place] [address] RECORD #: [id number] YOB: 1970   DATE OF STUDY: 04/26/2023       SLEEP STUDY REPORT    PROCEDURE:  Home sleep test.    INDICATION:  Suspected obstructive sleep apnea (ICD-10 code G47.33) in patient with snoring, irregular heart rhythm with fatigue and brain fog with unrefreshing sleep. An [place] sleepiness scale score 7/24, and a body mass index 31.0. RESULTS:  The patient underwent home sleep test with measurement of his nasal airflow, nasal air pressure, snoring, chest and abdominal wall motion, oximetry and body position. I have reviewed the entirety of the raw data of this study. During this study, the total recording time was 617 minutes. The lights-out clock time was 9:02 p.m. Lights on clock time was 7:19 a.m. There were four obstructive apneic events for an apnea index of 0.4 events per hour. There were 206 hypopneic events for a hypopnea index of 20 events per hour. The combined apnea plus hypopnea index is 20.4 events per hour. There was no significant hypoventilation Cheyne-Dill breathing or periodic breathing. The average oxygen saturation is 91%. The lowest oxygen saturation is 85%, and the average desaturation is 4%. The oxygen desaturation index is 20.4 events per hour. The patient spent 149 minutes in the supine position equivalent to 24% of the testing, and 467 minutes in the non-supine position equivalent to 76% of the testing. The average heart rate is 61 beats per minute. INTERPRETATION:  The data generated from this study is consistent with moderate obstructive sleep apnea (ICD-10 code G47.33). RECOMMENDATIONS:    1. CPAP titration. 2.   Weight loss. 3.   Avoid alcohol.   4.   Avoid sedating drug. 5.   Patient should not drive if at all sleepy. Please do not hesitate to contact me if there is any question whatsoever regarding interpretation of this study. Dictated By Tisha Vo MD  d: 04/29/2023 09:26:45  t: 04/29/2023 09:36:47  Job [id number]  [facility]/    cc: Peter Nix.  MD Qamar Rey, DO

## 2023-06-01 ENCOUNTER — LAB ENCOUNTER (OUTPATIENT)
Dept: LAB | Facility: REFERENCE LAB | Age: 53
End: 2023-06-01
Attending: FAMILY MEDICINE
Payer: COMMERCIAL

## 2023-06-01 DIAGNOSIS — E61.1 IRON DEFICIENCY: ICD-10-CM

## 2023-06-01 DIAGNOSIS — Z00.00 ROUTINE GENERAL MEDICAL EXAMINATION AT A HEALTH CARE FACILITY: ICD-10-CM

## 2023-06-01 LAB
ALBUMIN SERPL-MCNC: 3.9 G/DL (ref 3.4–5)
ALBUMIN/GLOB SERPL: 1 {RATIO} (ref 1–2)
ALP LIVER SERPL-CCNC: 67 U/L
ALT SERPL-CCNC: 63 U/L
ANION GAP SERPL CALC-SCNC: 7 MMOL/L (ref 0–18)
AST SERPL-CCNC: 31 U/L (ref 15–37)
BASOPHILS # BLD AUTO: 0.05 X10(3) UL (ref 0–0.2)
BASOPHILS NFR BLD AUTO: 0.8 %
BILIRUB SERPL-MCNC: 0.6 MG/DL (ref 0.1–2)
BUN BLD-MCNC: 14 MG/DL (ref 7–18)
BUN/CREAT SERPL: 16.7 (ref 10–20)
CALCIUM BLD-MCNC: 9.5 MG/DL (ref 8.5–10.1)
CHLORIDE SERPL-SCNC: 107 MMOL/L (ref 98–112)
CHOLEST SERPL-MCNC: 169 MG/DL (ref ?–200)
CO2 SERPL-SCNC: 26 MMOL/L (ref 21–32)
COMPLEXED PSA SERPL-MCNC: 1.18 NG/ML (ref ?–4)
CREAT BLD-MCNC: 0.84 MG/DL
DEPRECATED HBV CORE AB SER IA-ACNC: 28.7 NG/ML
DEPRECATED RDW RBC AUTO: 50.4 FL (ref 35.1–46.3)
EOSINOPHIL # BLD AUTO: 0.32 X10(3) UL (ref 0–0.7)
EOSINOPHIL NFR BLD AUTO: 5.1 %
ERYTHROCYTE [DISTWIDTH] IN BLOOD BY AUTOMATED COUNT: 17.5 % (ref 11–15)
FASTING PATIENT LIPID ANSWER: YES
FASTING STATUS PATIENT QL REPORTED: YES
GFR SERPLBLD BASED ON 1.73 SQ M-ARVRAT: 104 ML/MIN/1.73M2 (ref 60–?)
GLOBULIN PLAS-MCNC: 3.9 G/DL (ref 2.8–4.4)
GLUCOSE BLD-MCNC: 115 MG/DL (ref 70–99)
HCT VFR BLD AUTO: 57.7 %
HDLC SERPL-MCNC: 54 MG/DL (ref 40–59)
HGB BLD-MCNC: 18.6 G/DL
IMM GRANULOCYTES # BLD AUTO: 0.01 X10(3) UL (ref 0–1)
IMM GRANULOCYTES NFR BLD: 0.2 %
IRON SATN MFR SERPL: 18 %
IRON SERPL-MCNC: 80 UG/DL
LDLC SERPL CALC-MCNC: 99 MG/DL (ref ?–100)
LYMPHOCYTES # BLD AUTO: 2.4 X10(3) UL (ref 1–4)
LYMPHOCYTES NFR BLD AUTO: 37.9 %
MCH RBC QN AUTO: 27.2 PG (ref 26–34)
MCHC RBC AUTO-ENTMCNC: 32.2 G/DL (ref 31–37)
MCV RBC AUTO: 84.5 FL
MONOCYTES # BLD AUTO: 0.47 X10(3) UL (ref 0.1–1)
MONOCYTES NFR BLD AUTO: 7.4 %
NEUTROPHILS # BLD AUTO: 3.08 X10 (3) UL (ref 1.5–7.7)
NEUTROPHILS # BLD AUTO: 3.08 X10(3) UL (ref 1.5–7.7)
NEUTROPHILS NFR BLD AUTO: 48.6 %
NONHDLC SERPL-MCNC: 115 MG/DL (ref ?–130)
OSMOLALITY SERPL CALC.SUM OF ELEC: 291 MOSM/KG (ref 275–295)
PLATELET # BLD AUTO: 220 10(3)UL (ref 150–450)
POTASSIUM SERPL-SCNC: 4.1 MMOL/L (ref 3.5–5.1)
PROT SERPL-MCNC: 7.8 G/DL (ref 6.4–8.2)
RBC # BLD AUTO: 6.83 X10(6)UL
SODIUM SERPL-SCNC: 140 MMOL/L (ref 136–145)
TIBC SERPL-MCNC: 453 UG/DL (ref 240–450)
TRANSFERRIN SERPL-MCNC: 304 MG/DL (ref 200–360)
TRIGL SERPL-MCNC: 87 MG/DL (ref 30–149)
VIT D+METAB SERPL-MCNC: 53.4 NG/ML (ref 30–100)
VLDLC SERPL CALC-MCNC: 14 MG/DL (ref 0–30)
WBC # BLD AUTO: 6.3 X10(3) UL (ref 4–11)

## 2023-06-01 PROCEDURE — 84466 ASSAY OF TRANSFERRIN: CPT

## 2023-06-01 PROCEDURE — 85025 COMPLETE CBC W/AUTO DIFF WBC: CPT

## 2023-06-01 PROCEDURE — 80053 COMPREHEN METABOLIC PANEL: CPT

## 2023-06-01 PROCEDURE — 82306 VITAMIN D 25 HYDROXY: CPT

## 2023-06-01 PROCEDURE — 36415 COLL VENOUS BLD VENIPUNCTURE: CPT

## 2023-06-01 PROCEDURE — 80061 LIPID PANEL: CPT

## 2023-06-01 PROCEDURE — 83540 ASSAY OF IRON: CPT

## 2023-06-01 PROCEDURE — 82728 ASSAY OF FERRITIN: CPT

## 2023-06-08 ENCOUNTER — OFFICE VISIT (OUTPATIENT)
Dept: FAMILY MEDICINE CLINIC | Facility: CLINIC | Age: 53
End: 2023-06-08

## 2023-06-08 ENCOUNTER — MED REC SCAN ONLY (OUTPATIENT)
Dept: FAMILY MEDICINE CLINIC | Facility: CLINIC | Age: 53
End: 2023-06-08

## 2023-06-08 VITALS
OXYGEN SATURATION: 94 % | HEIGHT: 70 IN | WEIGHT: 212.81 LBS | BODY MASS INDEX: 30.47 KG/M2 | HEART RATE: 66 BPM | DIASTOLIC BLOOD PRESSURE: 73 MMHG | SYSTOLIC BLOOD PRESSURE: 133 MMHG

## 2023-06-08 DIAGNOSIS — R73.9 HYPERGLYCEMIA: ICD-10-CM

## 2023-06-08 DIAGNOSIS — Z00.00 ROUTINE GENERAL MEDICAL EXAMINATION AT A HEALTH CARE FACILITY: Primary | ICD-10-CM

## 2023-06-08 DIAGNOSIS — I10 ESSENTIAL HYPERTENSION: ICD-10-CM

## 2023-06-08 DIAGNOSIS — G47.33 OBSTRUCTIVE SLEEP APNEA SYNDROME: ICD-10-CM

## 2023-06-08 LAB
CARTRIDGE LOT#: ABNORMAL NUMERIC
HEMOGLOBIN A1C: 5.9 % (ref 4.3–5.6)

## 2023-06-08 PROCEDURE — 3078F DIAST BP <80 MM HG: CPT | Performed by: FAMILY MEDICINE

## 2023-06-08 PROCEDURE — 3008F BODY MASS INDEX DOCD: CPT | Performed by: FAMILY MEDICINE

## 2023-06-08 PROCEDURE — 3075F SYST BP GE 130 - 139MM HG: CPT | Performed by: FAMILY MEDICINE

## 2023-06-08 PROCEDURE — 99213 OFFICE O/P EST LOW 20 MIN: CPT | Performed by: FAMILY MEDICINE

## 2023-06-08 PROCEDURE — 99396 PREV VISIT EST AGE 40-64: CPT | Performed by: FAMILY MEDICINE

## 2023-06-08 PROCEDURE — 83036 HEMOGLOBIN GLYCOSYLATED A1C: CPT | Performed by: FAMILY MEDICINE

## 2023-07-07 ENCOUNTER — APPOINTMENT (OUTPATIENT)
Dept: HEMATOLOGY/ONCOLOGY | Facility: HOSPITAL | Age: 53
End: 2023-07-07
Attending: INTERNAL MEDICINE
Payer: COMMERCIAL

## 2023-07-15 NOTE — TELEPHONE ENCOUNTER
Refill requested for   Requested Prescriptions   Pending Prescriptions Disp Refills    Fluocinolone Acetonide (DERMOTIC) 0.01 % Otic Oil 20 mL 0     Sig: Place 5 drops in ear(s) 2 (two) times daily as needed. There is no refill protocol information for this order       albuterol (PROAIR HFA) 108 (90 Base) MCG/ACT Inhalation Aero Soln 1 each 0     Sig: Inhale 2 puffs into the lungs every 6 (six) hours as needed for Wheezing. Passed - Appt in past 6 mos or next 3 mos     Recent Outpatient Visits              1 month ago Routine general medical examination at [facility] facility    [address] [place],[address], [address] [facility], [facility] [address] [place], [place]    Office Visit    2 months ago Pulse irregularity    [address] [facility]    Office Visit    3 months ago Iron deficiency    [facility] Hematology Oncology Denver Lopes, MD    Office Visit    3 months ago Seasonal and perennial allergic rhinoconjunctivitis    [facility], [address] [facility], Linda Huitron MD    Office Visit    4 months ago [address] [facility], [place], [place]    Telemedicine                      Passed - Last Refill > 30 days     Last rescue inhaler refill: 4/1/2023                        Last office visit: 04/01/2023    Previously advised to follow up in No follow-up timeline advised in last office visit. Diagnosis of AR advised to follow-up in 1 year. F/U currently scheduled?  Not at this time    ACTION: RN left voicemail for patient to please call office back so we can triage asthma status

## 2023-07-18 RX ORDER — FLUOCINOLONE ACETONIDE 0.11 MG/ML
5 OIL AURICULAR (OTIC) 2 TIMES DAILY PRN
Qty: 20 ML | Refills: 0 | Status: SHIPPED | OUTPATIENT
Start: 2023-07-18

## 2023-07-18 RX ORDER — ALBUTEROL SULFATE 90 UG/1
2 AEROSOL, METERED RESPIRATORY (INHALATION) EVERY 6 HOURS PRN
Qty: 2 EACH | Refills: 0 | Status: SHIPPED | OUTPATIENT
Start: 2023-07-18

## 2023-07-18 NOTE — TELEPHONE ENCOUNTER
Patient returning a phone call in regards to albuterol refill request    Per patient is not having any active asthma symptoms  Uses albuterol inhaler at most once a week and receives good relief after use  Inhaler is just running low and wanted to get a refill before it ran out  Patient aware that if not receiving good relief after use or if needing to use it sooner than every 4 hours to go immediatly to ER   Patient requesting to have 2 inhalers dispensed, one to keep at home and one for on the go    Dr. Chanelle Small please advise  Patient is currently staying in [place] for the summer and is requesting to have refills sent to the [facility] in [facility] to refill?  The medications are pended below

## 2023-08-17 RX ORDER — ALBUTEROL SULFATE 90 UG/1
2 AEROSOL, METERED RESPIRATORY (INHALATION) EVERY 6 HOURS PRN
Qty: 17 EACH | Refills: 0 | OUTPATIENT
Start: 2023-08-17

## 2023-08-17 NOTE — TELEPHONE ENCOUNTER
Pt last seen on 4/01/2020. Refill for Albuterol received. RN called to triage pt. He reports that he did not request this and thinks it is the pharmacy who had it on an automatic refill request.  Pt requesting that we deny refill request.    Refill request denied.

## 2024-02-05 RX ORDER — FLUOCINOLONE ACETONIDE 0.11 MG/ML
5 OIL AURICULAR (OTIC) 2 TIMES DAILY PRN
Qty: 20 ML | Refills: 0 | Status: SHIPPED | OUTPATIENT
Start: 2024-02-05

## 2024-02-05 NOTE — TELEPHONE ENCOUNTER
Refill requested for    Fluocinolone Acetonide (DERMOTIC) 0.01 % Otic Oil         Sig: Place 5 drops in ear(s) 2 (two) times daily as needed.    Disp: 20 mL    Refills: 0    Start: 2/4/2024    Class: Normal    Non-formulary    Last ordered: 6 months ago (7/18/2023) by Gunnar Jimenez MD       To be filled at: [id number] - [place], [place] - [address] AT [facility], [phone], [phone]        Last office visit: 04/01/2023    Previously advised to follow up in one year     F/U currently scheduled? Not at this time.     ACTION: Routed to Dr. Jimenez for review since RX will be going to out of [facility] pharmacy.   [employer] message sent to patient to schedule follow up for April.

## 2024-03-22 RX ORDER — METOPROLOL SUCCINATE 25 MG/1
25 TABLET, EXTENDED RELEASE ORAL DAILY
Qty: 90 TABLET | Refills: 3 | Status: SHIPPED | OUTPATIENT
Start: 2024-03-22

## 2024-03-22 NOTE — TELEPHONE ENCOUNTER
Refill passed per [facility] protocol.    Requested Prescriptions   Pending Prescriptions Disp Refills    METOPROLOL SUCCINATE ER 25 MG Oral Tablet 24 Hr [Pharmacy Med Name: METOPROLOL SUCC ER 25 MG TAB] 90 tablet 3     Sig: TAKE 1 TABLET BY MOUTH EVERY DAY       Hypertension Medications Protocol Passed - 3/21/2024 11:17 PM        Passed - CMP or BMP in past 12 months        Passed - Last BP reading less than 140/90     BP Readings from Last 1 Encounters:   06/08/23 133/73               Passed - In person appointment or virtual visit in the past 12 mos or appointment in next 3 mos     Recent Outpatient Visits              9 months ago Routine general medical examination at a health care facility    [facility] [place]    Office Visit    11 months ago Pulse irregularity    [facility]    Office Visit    11 months ago Iron deficiency    [facility] Hematology Oncology Ridge Marin MD    Office Visit    11 months ago Seasonal and perennial [facility]iv[facility]    [facility]    1 year ago [facility]                      Passed - EGFRCR or GFRNAA > 50     GFR Evaluation  EGFRCR: 104 , resulted on 6/1/2023               Recent Outpatient Visits              9 months ago Routine general medical examination at a health care facility    [facility], Librado Soliman DO    Office Visit    11 months ago Pulse irregularity    [facility]    Office Visit    11 months ago Iron deficiency    [facility] Hematology Oncology Ridge Marin MD    Office Visit    11 months ago Seasonal and perennial allergic rhinoconjunctivitis    [facility], [place] Jimenez  Gunnar DÍAZ MD    Office Visit    1 year ago [facility], [address], [place] Librado Mosquera, DO    Telemedicine

## (undated) DEVICE — LINE MNTR ADLT SET O2 INTMD

## (undated) DEVICE — Device: Brand: CUSTOM PROCEDURE KIT

## (undated) DEVICE — Device: Brand: DEFENDO AIR/WATER/SUCTION AND BIOPSY VALVE

## (undated) DEVICE — MEDI-VAC NON-CONDUCTIVE SUCTION TUBING 6MM X 1.8M (6FT.) L: Brand: CARDINAL HEALTH

## (undated) DEVICE — CONMED SCOPE SAVER BITE BLOCK, 20X27 MM: Brand: SCOPE SAVER

## (undated) DEVICE — SPEEDBAND SUPERVIEW SUPER 7

## (undated) DEVICE — FORCEP RADIAL JAW 4

## (undated) NOTE — LETTER
4/11/2023              Kamran Castellon        [address] [address]         To whom it may concern,    Kamran Castellon is currently a patient under my medical care. The patient's medical condition makes serving on jury duty inadvisable at this time. Please excuse him or delay summons for 12 months. If you require additional information please do not hesitate to contact my office.         Sincerely,      DO ELMER [facility]onThe [facility]  [address] [place] [address]  [place] [id number] [phone]        Document electronically generated by:  Tiera Fowler DO

## (undated) NOTE — ED AVS SNAPSHOT
Ira Segura   MRN: [id number]    Department:  [facility] Emergency Department   Date of Visit:  11/7/2018           Disclosure     Insurance plans vary and the physician(s) referred by the ER may not be covered by your plan.  Please contac CARE PHYSICIAN AT ONCE OR RETURN IMMEDIATELY TO THE EMERGENCY DEPARTMENT. If you have been prescribed any medication(s), please fill your prescription right away and begin taking the medication(s) as directed.   If you believe that any of the medications

## (undated) NOTE — LETTER
Fauzia Haider [address], [place]  [address]  INFORMED CONSENT FOR TRANSFUSION OF BLOOD OR BLOOD PRODUCTS  My physician has informed me of the nature, purpose, benefits and risks of transfusion for blood and blood components that __________________________________________          ______________________________________________  (Signature of Patient)                                                            (Responsible party in case of Minor,

## (undated) NOTE — LETTER
[facility]  Administration of Anesthesia  1. Madonna Block, or _________________________________ acting on his behalf, (Patient) (Dependent/Representative) request to receive anesthesia for my pending procedure/operation/treatment. 6. OBSTETRIC PATIENTS: Specific risks/consequences of spinal/epidural anesthesia may include itching, low blood pressure, difficulty urinating, slowing of the baby's heart rate and headache.  Rare risks include infections, high spinal block, spinal bleeding ___________________________________________________           _____________________________________________________  Date/Time                                                                                               Responsible person in case of minor

## (undated) NOTE — LETTER
[facility], [place]  Authorization for Invasive Procedures  1.  I hereby authorize Dr. Maribeth Trujillo , my physician and whomever may be designated as the doctor's assistant, to perform the following operation and/or procedure:  esophagoga 4. Should the need arise during my operation or immediate post-operative period; I also consent to the administration of blood and/or blood products.  Further, I understand that despite careful testing and screening of blood and blood products, I may still 9. Patients having a sterilization procedure: I understand that if the procedure is successful the results will be permanent and it will therefore be impossible for me to inseminate, conceive or bear children.  I also understand that the procedure is intend